# Patient Record
Sex: MALE | Race: BLACK OR AFRICAN AMERICAN | NOT HISPANIC OR LATINO | Employment: UNEMPLOYED | URBAN - METROPOLITAN AREA
[De-identification: names, ages, dates, MRNs, and addresses within clinical notes are randomized per-mention and may not be internally consistent; named-entity substitution may affect disease eponyms.]

---

## 2021-04-12 ENCOUNTER — HISTORICAL (OUTPATIENT)
Dept: ADMINISTRATIVE | Facility: HOSPITAL | Age: 61
End: 2021-04-12

## 2021-10-03 ENCOUNTER — HISTORICAL (OUTPATIENT)
Dept: ADMINISTRATIVE | Facility: HOSPITAL | Age: 61
End: 2021-10-03

## 2021-10-03 LAB
CHOLEST SERPL-MCNC: 166 MG/DL
CHOLEST/HDLC SERPL: 5 {RATIO} (ref 0–5)
HDLC SERPL-MCNC: 34 MG/DL (ref 35–60)
LDLC SERPL CALC-MCNC: 112 MG/DL (ref 50–140)
TRIGL SERPL-MCNC: 99 MG/DL (ref 34–140)
VLDLC SERPL CALC-MCNC: 20 MG/DL

## 2022-04-09 ENCOUNTER — HISTORICAL (OUTPATIENT)
Dept: ADMINISTRATIVE | Facility: HOSPITAL | Age: 62
End: 2022-04-09

## 2022-04-25 VITALS
DIASTOLIC BLOOD PRESSURE: 80 MMHG | SYSTOLIC BLOOD PRESSURE: 127 MMHG | HEIGHT: 70 IN | WEIGHT: 211.19 LBS | BODY MASS INDEX: 30.23 KG/M2

## 2022-10-17 ENCOUNTER — HOSPITAL ENCOUNTER (EMERGENCY)
Facility: HOSPITAL | Age: 62
Discharge: HOME | End: 2022-10-17
Attending: FAMILY MEDICINE
Payer: MEDICAID

## 2022-10-17 VITALS
OXYGEN SATURATION: 98 % | RESPIRATION RATE: 14 BRPM | HEIGHT: 70 IN | DIASTOLIC BLOOD PRESSURE: 72 MMHG | BODY MASS INDEX: 30.25 KG/M2 | HEART RATE: 59 BPM | TEMPERATURE: 98 F | SYSTOLIC BLOOD PRESSURE: 144 MMHG | WEIGHT: 211.31 LBS
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
## 2022-10-17 DIAGNOSIS — M25.569 KNEE PAIN, UNSPECIFIED CHRONICITY, UNSPECIFIED LATERALITY: Primary | ICD-10-CM

## 2022-10-17 PROCEDURE — 99284 EMERGENCY DEPT VISIT MOD MDM: CPT | Mod: 25

## 2022-10-17 PROCEDURE — 63600175 PHARM REV CODE 636 W HCPCS: Performed by: PHYSICIAN ASSISTANT

## 2022-10-17 RX ORDER — TRAMADOL HYDROCHLORIDE 50 MG/1
50 TABLET ORAL EVERY 12 HOURS PRN
Qty: 10 TABLET | Refills: 0 | Status: SHIPPED | OUTPATIENT
Start: 2022-10-17 | End: 2022-10-22

## 2022-10-17 RX ORDER — PREDNISONE 10 MG/1
20 TABLET ORAL
Status: COMPLETED | OUTPATIENT
Start: 2022-10-17 | End: 2022-10-17

## 2022-10-17 RX ORDER — METHOCARBAMOL 500 MG/1
500 TABLET, FILM COATED ORAL 3 TIMES DAILY
Qty: 15 TABLET | Refills: 0 | Status: SHIPPED | OUTPATIENT
Start: 2022-10-17 | End: 2022-10-22

## 2022-10-17 RX ADMIN — PREDNISONE 20 MG: 10 TABLET ORAL at 11:10

## 2022-10-17 NOTE — ED PROVIDER NOTES
Encounter Date: 10/17/2022       History     Chief Complaint   Patient presents with    Leg Pain     Pt in with complaints of left leg and back pain after falling this morning.     Patient reports back and left leg pain after a slip and fall at home; pt denies any LOC; pt reports a chronic issue with his back and knee    The history is provided by the patient.   Leg Pain   The incident occurred at home. The injury mechanism was a fall. The incident occurred several hours ago. The pain is present in the left leg. The quality of the pain is described as aching. The pain is at a severity of 4/10. The pain has been Constant since onset. Pertinent negatives include no numbness, no inability to bear weight, no loss of motion, no muscle weakness, no loss of sensation and no tingling. He reports no foreign bodies present. The symptoms are aggravated by bearing weight and palpation. He has tried rest and NSAIDs for the symptoms. The treatment provided mild relief.   Review of patient's allergies indicates:  No Known Allergies  Past Medical History:   Diagnosis Date    Hypertension      Past Surgical History:   Procedure Laterality Date    HIP SURGERY Left     LEG SURGERY Left      No family history on file.  Social History     Tobacco Use    Smoking status: Every Day     Types: Cigarettes    Smokeless tobacco: Never     Review of Systems   Constitutional:  Negative for fever.   HENT:  Negative for sore throat.    Respiratory:  Negative for shortness of breath.    Cardiovascular:  Negative for chest pain.   Gastrointestinal:  Negative for nausea.   Genitourinary:  Negative for dysuria.   Musculoskeletal:  Positive for back pain.   Skin:  Negative for rash.   Neurological:  Negative for tingling, weakness and numbness.   Hematological:  Does not bruise/bleed easily.   Psychiatric/Behavioral: Negative.       Physical Exam     Initial Vitals [10/17/22 1014]   BP Pulse Resp Temp SpO2   (!) 144/72 (!) 59 14 97.5 °F (36.4 °C) 98 %       MAP       --         Physical Exam    Vitals reviewed.  Constitutional: He appears well-developed.   HENT:   Head: Normocephalic and atraumatic.   Eyes: Conjunctivae and EOM are normal. Pupils are equal, round, and reactive to light.   Neck:   Normal range of motion.  Cardiovascular:  Normal rate, regular rhythm and normal heart sounds.           Pulmonary/Chest: Breath sounds normal. He exhibits no tenderness.   Abdominal: Abdomen is soft. Bowel sounds are normal. He exhibits no distension. There is no abdominal tenderness.   Musculoskeletal:      Cervical back: Normal and normal range of motion.      Thoracic back: Normal.      Lumbar back: Tenderness present. No swelling. Decreased range of motion.        Back:       Right hip: Normal.      Left hip: Bony tenderness present. Decreased range of motion.      Right upper leg: Normal.      Left upper leg: Tenderness present. No edema or deformity.      Right lower leg: Normal.      Left lower leg: Normal.      Right ankle: Normal pulse.      Left ankle: Normal pulse.        Legs:      Neurological: He is alert and oriented to person, place, and time. He displays normal reflexes. No cranial nerve deficit or sensory deficit. GCS score is 15. GCS eye subscore is 4. GCS verbal subscore is 5. GCS motor subscore is 6.   Skin: Skin is warm. No pallor.   Psychiatric: He has a normal mood and affect. His behavior is normal. Judgment and thought content normal.       ED Course   Procedures  Labs Reviewed - No data to display       Imaging Results              X-Ray Knee 3 View Left (Final result)  Result time 10/17/22 12:01:51      Final result by Kenzie Davis MD (10/17/22 12:01:51)                   Impression:      No acute fracture identified.      Electronically signed by: Kenzie Davis  Date:    10/17/2022  Time:    12:01               Narrative:    EXAMINATION:  XR KNEE 3 VIEW LEFT    CLINICAL HISTORY:  Unspecified fall, initial  encounter    COMPARISON:  X-rays dated 04/25/2021    FINDINGS:  There is stable alignment following internal fixation of the femur.  There is no acute fracture or malalignment.  There is no joint effusion.  The soft tissues are unremarkable.                                       X-Ray Hip 2 or 3 views Left (with Pelvis when performed) (Final result)  Result time 10/17/22 12:00:54      Final result by Kenzie Davis MD (10/17/22 12:00:54)                   Impression:      No displaced fracture identified.      Electronically signed by: Kenzie Davis  Date:    10/17/2022  Time:    12:00               Narrative:    EXAMINATION:  XR HIP WITH PELVIS WHEN PERFORMED, 2 OR 3 VIEWS LEFT    CLINICAL HISTORY:  Unspecified fall, initial encounter    COMPARISON:  X-rays dated 04/25/2021    FINDINGS:  There is stable postoperative changes of the left femur.  There is no acute fracture or malalignment.  The hip joint spaces are preserved.  The pelvic ring structures are intact.                                       X-Ray Lumbar Spine Ap And Lateral (Final result)  Result time 10/17/22 11:59:46      Final result by Kenzie Davis MD (10/17/22 11:59:46)                   Impression:      No acute abnormality identified.      Electronically signed by: Kenzie Davis  Date:    10/17/2022  Time:    11:59               Narrative:    EXAMINATION:  XR LUMBAR SPINE AP AND LATERAL    CLINICAL HISTORY:  fall;    COMPARISON:  X-rays dated 04/25/2021    FINDINGS:  There are 5 non-rib-bearing lumbar type vertebral bodies.  Alignment is preserved.  The vertebral body heights and disc spaces are maintained.  There small marginal osteophytes.  There is moderate facet arthropathy.  There is scattered vascular calcifications.                                       Medications   predniSONE tablet 20 mg (20 mg Oral Given 10/17/22 1134)                              Clinical Impression:   Final diagnoses:  [W19.XXXA] Fall  [M25.569] Knee  pain, unspecified chronicity, unspecified laterality (Primary)      ED Disposition Condition    Discharge Stable          ED Prescriptions       Medication Sig Dispense Start Date End Date Auth. Provider    methocarbamoL (ROBAXIN) 500 MG Tab Take 1 tablet (500 mg total) by mouth 3 (three) times daily. for 5 days 15 tablet 10/17/2022 10/22/2022 NANCY Dickerson    traMADoL (ULTRAM) 50 mg tablet Take 1 tablet (50 mg total) by mouth every 12 (twelve) hours as needed for Pain. 10 tablet 10/17/2022 10/22/2022 NANCY Dickerson          Follow-up Information       Follow up With Specialties Details Why Contact Info    discharge followup    If your symptoms become WORSE or you DO NOT IMPROVE and you are unable to reach your health care provider, you should RETURN to the emergency department    discharge info    Discussed all pertinent ED information, results, diagnosis and treatment plan; All questions and concerns were addressed at this time. Patient voices understanding of information and instructions. Patient is comfortable with plan and discharge             NANCY Dickerson  10/17/22 [id number]

## 2023-04-02 ENCOUNTER — HOSPITAL ENCOUNTER (EMERGENCY)
Facility: HOSPITAL | Age: 63
Discharge: PSYCHIATRIC OR SUBSTANCE USE FACILITY | End: 2023-04-02
Attending: INTERNAL MEDICINE
Payer: MEDICAID

## 2023-04-02 VITALS
BODY MASS INDEX: 30.28 KG/M2 | WEIGHT: 211 LBS | HEART RATE: 63 BPM | OXYGEN SATURATION: 98 % | RESPIRATION RATE: 16 BRPM | SYSTOLIC BLOOD PRESSURE: 146 MMHG | DIASTOLIC BLOOD PRESSURE: 77 MMHG

## 2023-04-02 DIAGNOSIS — F23 ACUTE PSYCHOSIS: ICD-10-CM

## 2023-04-02 DIAGNOSIS — N30.00 ACUTE CYSTITIS WITHOUT HEMATURIA: ICD-10-CM

## 2023-04-02 DIAGNOSIS — F14.10 COCAINE ABUSE: ICD-10-CM

## 2023-04-02 DIAGNOSIS — R44.3 HALLUCINATIONS: ICD-10-CM

## 2023-04-02 DIAGNOSIS — Z00.8 MEDICAL CLEARANCE FOR PSYCHIATRIC ADMISSION: ICD-10-CM

## 2023-04-02 DIAGNOSIS — F12.91 MARIJUANA USE DISORDER IN REMISSION: Primary | ICD-10-CM

## 2023-04-02 LAB
ALBUMIN SERPL-MCNC: 4.5 G/DL (ref 3.4–4.8)
ALBUMIN/GLOB SERPL: 1 RATIO (ref 1.1–2)
ALP SERPL-CCNC: 89 UNIT/L (ref 40–150)
ALT SERPL-CCNC: 44 UNIT/L (ref 0–55)
AMPHET UR QL SCN: NEGATIVE
APAP SERPL-MCNC: <17.4 UG/ML (ref 17.4–30)
APPEARANCE UR: CLEAR
AST SERPL-CCNC: 29 UNIT/L (ref 5–34)
BACTERIA #/AREA URNS AUTO: ABNORMAL /HPF
BARBITURATE SCN PRESENT UR: NEGATIVE
BASOPHILS # BLD AUTO: 0.08 X10(3)/MCL (ref 0–0.2)
BASOPHILS NFR BLD AUTO: 1 %
BENZODIAZ UR QL SCN: NEGATIVE
BILIRUB UR QL STRIP.AUTO: NEGATIVE MG/DL
BILIRUBIN DIRECT+TOT PNL SERPL-MCNC: 0.5 MG/DL
BUN SERPL-MCNC: 11.4 MG/DL (ref 8.4–25.7)
CALCIUM SERPL-MCNC: 10.7 MG/DL (ref 8.8–10)
CANNABINOIDS UR QL SCN: POSITIVE
CHLORIDE SERPL-SCNC: 106 MMOL/L (ref 98–107)
CO2 SERPL-SCNC: 22 MMOL/L (ref 23–31)
COCAINE UR QL SCN: POSITIVE
COLOR UR AUTO: YELLOW
CREAT SERPL-MCNC: 0.85 MG/DL (ref 0.73–1.18)
EOSINOPHIL # BLD AUTO: 0.18 X10(3)/MCL (ref 0–0.9)
EOSINOPHIL NFR BLD AUTO: 2.3 %
ERYTHROCYTE [DISTWIDTH] IN BLOOD BY AUTOMATED COUNT: 14.6 % (ref 11.5–17)
ETHANOL SERPL-MCNC: <10 MG/DL
FENTANYL UR QL SCN: NEGATIVE
FLUAV AG UPPER RESP QL IA.RAPID: NOT DETECTED
FLUBV AG UPPER RESP QL IA.RAPID: NOT DETECTED
GFR SERPLBLD CREATININE-BSD FMLA CKD-EPI: >60 MLS/MIN/1.73/M2
GLOBULIN SER-MCNC: 4.3 GM/DL (ref 2.4–3.5)
GLUCOSE SERPL-MCNC: 110 MG/DL (ref 82–115)
GLUCOSE UR QL STRIP.AUTO: NORMAL MG/DL
HCT VFR BLD AUTO: 49.5 % (ref 42–52)
HGB BLD-MCNC: 16.2 G/DL (ref 14–18)
HYALINE CASTS #/AREA URNS LPF: >20 /LPF
IMM GRANULOCYTES # BLD AUTO: 0.02 X10(3)/MCL (ref 0–0.04)
IMM GRANULOCYTES NFR BLD AUTO: 0.3 %
KETONES UR QL STRIP.AUTO: ABNORMAL MG/DL
LEUKOCYTE ESTERASE UR QL STRIP.AUTO: 75 UNIT/L
LYMPHOCYTES # BLD AUTO: 2.22 X10(3)/MCL (ref 0.6–4.6)
LYMPHOCYTES NFR BLD AUTO: 28.8 %
MCH RBC QN AUTO: 28.7 PG (ref 27–31)
MCHC RBC AUTO-ENTMCNC: 32.7 G/DL (ref 33–36)
MCV RBC AUTO: 87.6 FL (ref 80–94)
MDMA UR QL SCN: NEGATIVE
MONOCYTES # BLD AUTO: 0.72 X10(3)/MCL (ref 0.1–1.3)
MONOCYTES NFR BLD AUTO: 9.3 %
MUCOUS THREADS URNS QL MICRO: ABNORMAL /LPF
NEUTROPHILS # BLD AUTO: 4.49 X10(3)/MCL (ref 2.1–9.2)
NEUTROPHILS NFR BLD AUTO: 58.3 %
NITRITE UR QL STRIP.AUTO: NEGATIVE
NRBC BLD AUTO-RTO: 0 %
OPIATES UR QL SCN: NEGATIVE
PCP UR QL: NEGATIVE
PH UR STRIP.AUTO: 5.5 [PH]
PH UR: 5.5 [PH] (ref 3–11)
PLATELET # BLD AUTO: 286 X10(3)/MCL (ref 130–400)
PMV BLD AUTO: 10.1 FL (ref 7.4–10.4)
POTASSIUM SERPL-SCNC: 3.6 MMOL/L (ref 3.5–5.1)
PROT SERPL-MCNC: 8.8 GM/DL (ref 5.8–7.6)
PROT UR QL STRIP.AUTO: ABNORMAL MG/DL
RBC # BLD AUTO: 5.65 X10(6)/MCL (ref 4.7–6.1)
RBC #/AREA URNS AUTO: ABNORMAL /HPF
RBC UR QL AUTO: NEGATIVE UNIT/L
SARS-COV-2 RNA RESP QL NAA+PROBE: NOT DETECTED
SODIUM SERPL-SCNC: 138 MMOL/L (ref 136–145)
SP GR UR STRIP.AUTO: 1.02
SQUAMOUS #/AREA URNS LPF: ABNORMAL /HPF
TSH SERPL-ACNC: 1.08 UIU/ML (ref 0.35–4.94)
UROBILINOGEN UR STRIP-ACNC: ABNORMAL MG/DL
WBC # SPEC AUTO: 7.7 X10(3)/MCL (ref 4.5–11.5)
WBC #/AREA URNS AUTO: ABNORMAL /HPF

## 2023-04-02 PROCEDURE — 85025 COMPLETE CBC W/AUTO DIFF WBC: CPT | Performed by: INTERNAL MEDICINE

## 2023-04-02 PROCEDURE — 80143 DRUG ASSAY ACETAMINOPHEN: CPT | Performed by: INTERNAL MEDICINE

## 2023-04-02 PROCEDURE — 84443 ASSAY THYROID STIM HORMONE: CPT | Performed by: INTERNAL MEDICINE

## 2023-04-02 PROCEDURE — 99285 EMERGENCY DEPT VISIT HI MDM: CPT

## 2023-04-02 PROCEDURE — 96372 THER/PROPH/DIAG INJ SC/IM: CPT | Performed by: INTERNAL MEDICINE

## 2023-04-02 PROCEDURE — 93005 ELECTROCARDIOGRAM TRACING: CPT

## 2023-04-02 PROCEDURE — 63600175 PHARM REV CODE 636 W HCPCS: Performed by: INTERNAL MEDICINE

## 2023-04-02 PROCEDURE — 81001 URINALYSIS AUTO W/SCOPE: CPT | Performed by: INTERNAL MEDICINE

## 2023-04-02 PROCEDURE — 80053 COMPREHEN METABOLIC PANEL: CPT | Performed by: INTERNAL MEDICINE

## 2023-04-02 PROCEDURE — 0240U COVID/FLU A&B PCR: CPT | Performed by: INTERNAL MEDICINE

## 2023-04-02 PROCEDURE — 82077 ASSAY SPEC XCP UR&BREATH IA: CPT | Performed by: INTERNAL MEDICINE

## 2023-04-02 PROCEDURE — 80307 DRUG TEST PRSMV CHEM ANLYZR: CPT | Performed by: INTERNAL MEDICINE

## 2023-04-02 RX ORDER — NITROFURANTOIN 25; 75 MG/1; MG/1
100 CAPSULE ORAL 2 TIMES DAILY
Qty: 10 CAPSULE | Refills: 0 | Status: SHIPPED | OUTPATIENT
Start: 2023-04-02 | End: 2023-04-07

## 2023-04-02 RX ORDER — NITROFURANTOIN 25; 75 MG/1; MG/1
100 CAPSULE ORAL EVERY 12 HOURS
Status: DISCONTINUED | OUTPATIENT
Start: 2023-04-02 | End: 2023-04-02 | Stop reason: HOSPADM

## 2023-04-02 RX ORDER — DROPERIDOL 2.5 MG/ML
2.5 INJECTION, SOLUTION INTRAMUSCULAR; INTRAVENOUS ONCE
Status: COMPLETED | OUTPATIENT
Start: 2023-04-02 | End: 2023-04-02

## 2023-04-02 RX ADMIN — DROPERIDOL 2.5 MG: 2.5 INJECTION, SOLUTION INTRAMUSCULAR; INTRAVENOUS at 09:04

## 2023-04-02 NOTE — ED PROVIDER NOTES
"Date: 4/2/2023    Time of Note: 8:43 AM     Source of History:  History from patient and medics    Chief complaint:  Hallucinations ("Smoked weed" about and hour ago now having AVH. Denies SI/HI calm and cooperative.)      HPI:    62 y.o. male,  has a past medical history of Hypertension. Presenting with Hallucinations ("Smoked weed" about and hour ago now having AVH. Denies SI/HI calm and cooperative.)       Review of Systems:    As per HPI and below:  Constitutional: No Fever.  No Chills.  Eyes: No Visual Changes.  ENT: None voiced by patient.    Cardiovascular: No Chest Pain.  Respiratory: No Shortness of breath. No Cough  GastrointestinaI: No Abdominal Pain.  No Nausea No Vomiting. No Diarrhea, No Constipation.  Genitourinary: No Dysuria  Neurologic: No Headache. No New Focal Weakness.  MSK: No Back Pain.  Integument: No Rash.  Psychiatric:  Use some marijuana and started having hallucinations and can not control himself    Allergies:    Review of patient's allergies indicates:  No Known Allergies    Home Medicines on Chart:    No current outpatient medications on file prior to encounter.       PMH:  As per HPI and below:    Past Medical History:   Diagnosis Date    Hypertension        Past Surgical History:   Procedure Laterality Date    HIP SURGERY Left     LEG SURGERY Left        Social History     Tobacco Use    Smoking status: Every Day     Types: Cigarettes    Smokeless tobacco: Never       History reviewed. No pertinent family history.     Patient Active Problem List    Diagnosis Date Noted    Cocaine abuse 04/02/2023    Marijuana use disorder in remission 04/02/2023       Physical Exam:    Vitals:    04/02/23 0802   BP: (!) 167/105   Pulse: 105   Resp: 18       BP (!) 167/105   Pulse 105   Resp 18   Wt 95.7 kg (211 lb)   SpO2 98%   BMI 30.28 kg/m²     Nursing note and vital signs reviewed.    Constitutional: No acute distress.  Nontoxic  Eyes: No conjunctival injection.  Extraocular muscles are " "intact.  ENT: Oropharynx clear.    Cardiovascular: Regular rate and rhythm.  No murmurs.   Respiratory: No Respiratory Distress. Good Bilateral air movement.  No rhonchi. No rales.  Abdomen: Soft.  Not distended.  Nontender.  No guarding.  No rebound. Bowel Sounds Normal.  Musculoskeletal: Good range of motion all joints.  No Gross deformities Noted.  Skin: No Obvious Rashes seen.    Neurologic:  Awake and Alert.  Cranial Nerves Grossly intact. No focal neurological deficits.  Psychiatry:  Hallucinations, history of schizophrenia, disheveled look, crying,    Labs that have been ordered have been independently reviewed and interpreted by myself.      MDM/ Differential Dx:  Reviewed Nurses Notes and Vitals.    Medical Decision Making  62 y.o. male,  has a past medical history of Hypertension. Presenting with Hallucinations ("Smoked weed" about and hour ago now having AVH. Denies SI/HI calm and cooperative.)  Crying, says he wants to go somewhere where he can get some help,    Patient is very psychotic, crying, hallucinating, will give him Inapsine to help him with his psychosis, and will pec him to send him to a psych facility for evaluation and further treatment as needed.      Problems Addressed:  Acute cystitis without hematuria:     Details: Will put patient on nitrofurantoin  Acute psychosis:     Details: Patient is given Inapsine in the emergency room, he is common comfortable, will send him to the psych facility for further evaluation and treatment as needed    Amount and/or Complexity of Data Reviewed  Labs: ordered. Decision-making details documented in ED Course.         ED COURSE    ED Course as of 04/02/23 1010   Sun Apr 02, 2023   1000 Patient has calmed down, his actually sleeping not crying anymore, I will clear him to go to the psych facility for further management by the psychiatrist, he does have cocaine and marijuana on him, he does have some leukocyte esterase in the urine, I will put him on " nitrofurantoin for 5 days. [GQ]      ED Course User Index  [GQ] Jeancarlos Louise MD       ED Orders:  Orders Placed This Encounter   Procedures    CBC auto differential    Comprehensive metabolic panel    TSH    Urinalysis, Reflex to Urine Culture    Drug Screen panel, emergency    Ethanol    Acetaminophen level    CBC with Differential    COVID/FLU A&B PCR    Vital signs    Undress patient and allow them to wear facility provided apparel.    Direct Psych Observation    EKG 12-lead    PEC/Psych Hold - Physicians Emergency Certificate / 72 Hour Psych Hold       ED MEDICINE:  Medications   nitrofurantoin (macrocrystal-monohydrate) 100 MG capsule 100 mg (has no administration in time range)   droperidoL injection 2.5 mg (2.5 mg Intramuscular Given 4/2/23 0932)       Admission on 04/02/2023   Component Date Value Ref Range Status    Sodium Level 04/02/2023 138  136 - 145 mmol/L Final    Potassium Level 04/02/2023 3.6  3.5 - 5.1 mmol/L Final    Chloride 04/02/2023 106  98 - 107 mmol/L Final    Carbon Dioxide 04/02/2023 22 (L)  23 - 31 mmol/L Final    Glucose Level 04/02/2023 110  82 - 115 mg/dL Final    Blood Urea Nitrogen 04/02/2023 11.4  8.4 - 25.7 mg/dL Final    Creatinine 04/02/2023 0.85  0.73 - 1.18 mg/dL Final    Calcium Level Total 04/02/2023 10.7 (H)  8.8 - 10.0 mg/dL Final    Protein Total 04/02/2023 8.8 (H)  5.8 - 7.6 gm/dL Final    Albumin Level 04/02/2023 4.5  3.4 - 4.8 g/dL Final    Globulin 04/02/2023 4.3 (H)  2.4 - 3.5 gm/dL Final    Albumin/Globulin Ratio 04/02/2023 1.0 (L)  1.1 - 2.0 ratio Final    Bilirubin Total 04/02/2023 0.5  <=1.5 mg/dL Final    Alkaline Phosphatase 04/02/2023 89  40 - 150 unit/L Final    Alanine Aminotransferase 04/02/2023 44  0 - 55 unit/L Final    Aspartate Aminotransferase 04/02/2023 29  5 - 34 unit/L Final    eGFR 04/02/2023 >60  mls/min/1.73/m2 Final    Thyroid Stimulating Hormone 04/02/2023 1.077  0.350 - 4.940 uIU/mL Final    Color, UA 04/02/2023 Yellow  Yellow,  Light-Yellow, Dark Yellow, Love, Straw Final    Appearance, UA 04/02/2023 Clear  Clear Final    Specific Gravity, UA 04/02/2023 1.025   Final    pH, UA 04/02/2023 5.5  5.0 - 8.5 Final    Protein, UA 04/02/2023 Trace (A)  Negative mg/dL Final    Glucose, UA 04/02/2023 Normal  Negative, Normal mg/dL Final    Ketones, UA 04/02/2023 Trace (A)  Negative mg/dL Final    Blood, UA 04/02/2023 Negative  Negative unit/L Final    Bilirubin, UA 04/02/2023 Negative  Negative mg/dL Final    Urobilinogen, UA 04/02/2023 2+ (A)  0.2, 1.0, Normal mg/dL Final    Nitrites, UA 04/02/2023 Negative  Negative Final    Leukocyte Esterase, UA 04/02/2023 75 (A)  Negative unit/L Final    WBC, UA 04/02/2023 0-5  None Seen, 0-2, 3-5, 0-5 /HPF Final    Bacteria, UA 04/02/2023 None Seen  None Seen /HPF Final    Squamous Epithelial Cells, UA 04/02/2023 Trace (A)  None Seen /HPF Final    Mucous, UA 04/02/2023 Many (A)  None Seen /LPF Final    Hyaline Casts, UA 04/02/2023 >20 (A)  None Seen /lpf Final    RBC, UA 04/02/2023 0-5  None Seen, 0-2, 3-5, 0-5 /HPF Final    Amphetamines, Urine 04/02/2023 Negative  Negative Final    Barbituates, Urine 04/02/2023 Negative  Negative Final    Benzodiazepine, Urine 04/02/2023 Negative  Negative Final    Cannabinoids, Urine 04/02/2023 Positive (A)  Negative Final    Cocaine, Urine 04/02/2023 Positive (A)  Negative Final    Fentanyl, Urine 04/02/2023 Negative  Negative Final    MDMA, Urine 04/02/2023 Negative  Negative Final    Opiates, Urine 04/02/2023 Negative  Negative Final    Phencyclidine, Urine 04/02/2023 Negative  Negative Final    pH, Urine 04/02/2023 5.5  3.0 - 11.0 Final    Ethanol Level 04/02/2023 <10.0  <=10.0 mg/dL Final    Acetaminophen Level 04/02/2023 <17.4 (L)  17.4 - 30.0 ug/ml Final    WBC 04/02/2023 7.7  4.5 - 11.5 x10(3)/mcL Final    RBC 04/02/2023 5.65  4.70 - 6.10 x10(6)/mcL Final    Hgb 04/02/2023 16.2  14.0 - 18.0 g/dL Final    Hct 04/02/2023 49.5  42.0 - 52.0 % Final    MCV 04/02/2023  87.6  80.0 - 94.0 fL Final    MCH 04/02/2023 28.7  27.0 - 31.0 pg Final    MCHC 04/02/2023 32.7 (L)  33.0 - 36.0 g/dL Final    RDW 04/02/2023 14.6  11.5 - 17.0 % Final    Platelet 04/02/2023 286  130 - 400 x10(3)/mcL Final    MPV 04/02/2023 10.1  7.4 - 10.4 fL Final    Neut % 04/02/2023 58.3  % Final    Lymph % 04/02/2023 28.8  % Final    Mono % 04/02/2023 9.3  % Final    Eos % 04/02/2023 2.3  % Final    Basophil % 04/02/2023 1.0  % Final    Lymph # 04/02/2023 2.22  0.6 - 4.6 x10(3)/mcL Final    Neut # 04/02/2023 4.49  2.1 - 9.2 x10(3)/mcL Final    Mono # 04/02/2023 0.72  0.1 - 1.3 x10(3)/mcL Final    Eos # 04/02/2023 0.18  0 - 0.9 x10(3)/mcL Final    Baso # 04/02/2023 0.08  0 - 0.2 x10(3)/mcL Final    IG# 04/02/2023 0.02  0 - 0.04 x10(3)/mcL Final    IG% 04/02/2023 0.3  % Final    NRBC% 04/02/2023 0.0  % Final    Influenza A PCR 04/02/2023 Not Detected  Not Detected Final    Influenza B PCR 04/02/2023 Not Detected  Not Detected Final    SARS-CoV-2 PCR 04/02/2023 Not Detected  Not Detected, Negative, Invalid Final       ECG Results              EKG 12-lead (Preliminary result)  Result time 04/02/23 10:08:56      Wet Read by Jeancarlos Louise MD (04/02/23 10:08:56, [facility] Dept, Emergency Medicine)    EKG: Independently reviewed and / or Interpreted by Jeancarlos Louise MD. independently as Normal Sinus Rhythm, Rate 68, Normal Axis, LVH, Q waves in anterior septal leads, No STEMI , nonspecific intraventricular conduction delay.                                      No orders to display       Psych Clearance:    At the time of my examination, patient does not appear to have any major medical condition which needs to be addressed by admission to hospital and appears to be in medically optimal condition to undergo a psychiatric evaluation and treatment as needed in a psych facility, Patient will be in a monitored facility and they can always bring back to our ER or the nearest ER for reevaluation in  case develops any other symptoms.    Medically cleared for psychiatry placement: 4/2/2023 10:02 AM                 Diagnostic Impression:        ICD-10-CM ICD-9-CM   1. Marijuana use disorder in remission  F12.91 305.23   2. Medical clearance for psychiatric admission  Z00.8 V70.8   3. Acute psychosis  F23 298.9   4. Hallucinations  R44.3 780.1   5. Cocaine abuse  F14.10 305.60   6. Acute cystitis without hematuria  N30.00 595.0      ED Disposition Condition    Transfer to Psych Facility Stable          ED Prescriptions       Medication Sig Dispense Start Date End Date Auth. Provider    nitrofurantoin, macrocrystal-monohydrate, (MACROBID) 100 MG capsule Take 1 capsule (100 mg total) by mouth 2 (two) times daily. for 5 days 10 capsule 4/2/2023 4/7/2023 Jeancarlos Louise MD          Follow-up Information    None          Medication List        START taking these medications      nitrofurantoin (macrocrystal-monohydrate) 100 MG capsule  Commonly known as: MACROBID  Take 1 capsule (100 mg total) by mouth 2 (two) times daily. for 5 days               Where to Get Your Medications        You can get these medications from any pharmacy    Bring a paper prescription for each of these medications  nitrofurantoin (macrocrystal-monohydrate) 100 MG capsule            Jeancarlos Louise MD  04/02/23 1003       Jeancarlos Louise MD  04/02/23 1010

## 2023-05-08 ENCOUNTER — HOSPITAL ENCOUNTER (EMERGENCY)
Facility: HOSPITAL | Age: 63
Discharge: HOME | End: 2023-05-08
Attending: FAMILY MEDICINE
Payer: MEDICAID

## 2023-05-08 VITALS
BODY MASS INDEX: 30.28 KG/M2 | OXYGEN SATURATION: 100 % | TEMPERATURE: 98 F | SYSTOLIC BLOOD PRESSURE: 112 MMHG | HEART RATE: 68 BPM | WEIGHT: 211 LBS | DIASTOLIC BLOOD PRESSURE: 76 MMHG | RESPIRATION RATE: 18 BRPM

## 2023-05-08 DIAGNOSIS — G89.29 CHRONIC LOW BACK PAIN, UNSPECIFIED BACK PAIN LATERALITY, UNSPECIFIED WHETHER SCIATICA PRESENT: Primary | ICD-10-CM

## 2023-05-08 DIAGNOSIS — M54.50 CHRONIC LOW BACK PAIN, UNSPECIFIED BACK PAIN LATERALITY, UNSPECIFIED WHETHER SCIATICA PRESENT: Primary | ICD-10-CM

## 2023-05-08 PROCEDURE — 63600175 PHARM REV CODE 636 W HCPCS: Performed by: PHYSICIAN ASSISTANT

## 2023-05-08 PROCEDURE — 96372 THER/PROPH/DIAG INJ SC/IM: CPT | Performed by: PHYSICIAN ASSISTANT

## 2023-05-08 PROCEDURE — 25000003 PHARM REV CODE 250: Performed by: PHYSICIAN ASSISTANT

## 2023-05-08 PROCEDURE — 99284 EMERGENCY DEPT VISIT MOD MDM: CPT

## 2023-05-08 RX ORDER — HYDROCODONE BITARTRATE AND ACETAMINOPHEN 5; 325 MG/1; MG/1
1 TABLET ORAL
Status: COMPLETED | OUTPATIENT
Start: 2023-05-08 | End: 2023-05-08

## 2023-05-08 RX ORDER — KETOROLAC TROMETHAMINE 30 MG/ML
30 INJECTION, SOLUTION INTRAMUSCULAR; INTRAVENOUS
Status: COMPLETED | OUTPATIENT
Start: 2023-05-08 | End: 2023-05-08

## 2023-05-08 RX ORDER — METHOCARBAMOL 500 MG/1
500 TABLET, FILM COATED ORAL 3 TIMES DAILY
Qty: 15 TABLET | Refills: 0 | Status: SHIPPED | OUTPATIENT
Start: 2023-05-08 | End: 2023-05-13

## 2023-05-08 RX ORDER — INDOMETHACIN 25 MG/1
25 CAPSULE ORAL
Qty: 15 CAPSULE | Refills: 0 | Status: SHIPPED | OUTPATIENT
Start: 2023-05-08 | End: 2023-05-13

## 2023-05-08 RX ADMIN — HYDROCODONE BITARTRATE AND ACETAMINOPHEN 1 TABLET: 5; 325 TABLET ORAL at 10:05

## 2023-05-08 RX ADMIN — KETOROLAC TROMETHAMINE 30 MG: 30 INJECTION, SOLUTION INTRAMUSCULAR; INTRAVENOUS at 10:05
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
## 2023-05-17 ENCOUNTER — HOSPITAL ENCOUNTER (INPATIENT)
Facility: HOSPITAL | Age: 63
LOS: 5 days | Discharge: HOME | DRG: 885 | End: 2023-05-22
Attending: PSYCHIATRY & NEUROLOGY | Admitting: PSYCHIATRY & NEUROLOGY
Payer: MEDICAID

## 2023-05-17 ENCOUNTER — HOSPITAL ENCOUNTER (EMERGENCY)
Facility: HOSPITAL | Age: 63
Discharge: PSYCHIATRIC OR SUBSTANCE USE FACILITY | End: 2023-05-17
Attending: FAMILY MEDICINE
Payer: MEDICAID

## 2023-05-17 VITALS
HEART RATE: 72 BPM | RESPIRATION RATE: 20 BRPM | OXYGEN SATURATION: 98 % | DIASTOLIC BLOOD PRESSURE: 66 MMHG | BODY MASS INDEX: 31.98 KG/M2 | SYSTOLIC BLOOD PRESSURE: 124 MMHG | HEIGHT: 68 IN | TEMPERATURE: 97 F | WEIGHT: 211 LBS

## 2023-05-17 DIAGNOSIS — F20.9 SCHIZOPHRENIA: ICD-10-CM

## 2023-05-17 DIAGNOSIS — R44.1 VISUAL HALLUCINATIONS: Primary | ICD-10-CM

## 2023-05-17 DIAGNOSIS — R44.0 AUDITORY HALLUCINATIONS: ICD-10-CM

## 2023-05-17 LAB
ALBUMIN SERPL-MCNC: 4.2 G/DL (ref 3.4–4.8)
ALBUMIN/GLOB SERPL: 1 RATIO (ref 1.1–2)
ALP SERPL-CCNC: 95 UNIT/L (ref 40–150)
ALT SERPL-CCNC: 73 UNIT/L (ref 0–55)
AMPHET UR QL SCN: NEGATIVE
APPEARANCE UR: CLEAR
AST SERPL-CCNC: 39 UNIT/L (ref 5–34)
BACTERIA #/AREA URNS AUTO: ABNORMAL /HPF
BARBITURATE SCN PRESENT UR: NEGATIVE
BASOPHILS # BLD AUTO: 0.07 X10(3)/MCL
BASOPHILS NFR BLD AUTO: 0.8 %
BENZODIAZ UR QL SCN: NEGATIVE
BILIRUB UR QL STRIP.AUTO: NEGATIVE MG/DL
BILIRUBIN DIRECT+TOT PNL SERPL-MCNC: 0.5 MG/DL
BUN SERPL-MCNC: 20.1 MG/DL (ref 8.4–25.7)
CALCIUM SERPL-MCNC: 10.4 MG/DL (ref 8.8–10)
CANNABINOIDS UR QL SCN: POSITIVE
CHLORIDE SERPL-SCNC: 109 MMOL/L (ref 98–107)
CO2 SERPL-SCNC: 21 MMOL/L (ref 23–31)
COCAINE UR QL SCN: POSITIVE
COLOR UR: YELLOW
CREAT SERPL-MCNC: 0.97 MG/DL (ref 0.73–1.18)
EOSINOPHIL # BLD AUTO: 0.21 X10(3)/MCL (ref 0–0.9)
EOSINOPHIL NFR BLD AUTO: 2.4 %
ERYTHROCYTE [DISTWIDTH] IN BLOOD BY AUTOMATED COUNT: 14.6 % (ref 11.5–17)
ETHANOL SERPL-MCNC: <10 MG/DL
FENTANYL UR QL SCN: NEGATIVE
GFR SERPLBLD CREATININE-BSD FMLA CKD-EPI: >60 MLS/MIN/1.73/M2
GLOBULIN SER-MCNC: 4.1 GM/DL (ref 2.4–3.5)
GLUCOSE SERPL-MCNC: 104 MG/DL (ref 82–115)
GLUCOSE UR QL STRIP.AUTO: NORMAL MG/DL
HCT VFR BLD AUTO: 47.3 % (ref 42–52)
HGB BLD-MCNC: 15.4 G/DL (ref 14–18)
HYALINE CASTS #/AREA URNS LPF: ABNORMAL /LPF
IMM GRANULOCYTES # BLD AUTO: 0.02 X10(3)/MCL (ref 0–0.04)
IMM GRANULOCYTES NFR BLD AUTO: 0.2 %
KETONES UR QL STRIP.AUTO: NEGATIVE MG/DL
LEUKOCYTE ESTERASE UR QL STRIP.AUTO: NEGATIVE UNIT/L
LYMPHOCYTES # BLD AUTO: 3.27 X10(3)/MCL (ref 0.6–4.6)
LYMPHOCYTES NFR BLD AUTO: 37.2 %
MCH RBC QN AUTO: 28.7 PG (ref 27–31)
MCHC RBC AUTO-ENTMCNC: 32.6 G/DL (ref 33–36)
MCV RBC AUTO: 88.1 FL (ref 80–94)
MDMA UR QL SCN: NEGATIVE
MONOCYTES # BLD AUTO: 0.79 X10(3)/MCL (ref 0.1–1.3)
MONOCYTES NFR BLD AUTO: 9 %
MUCOUS THREADS URNS QL MICRO: ABNORMAL /LPF
NEUTROPHILS # BLD AUTO: 4.43 X10(3)/MCL (ref 2.1–9.2)
NEUTROPHILS NFR BLD AUTO: 50.4 %
NITRITE UR QL STRIP.AUTO: NEGATIVE
NRBC BLD AUTO-RTO: 0 %
OPIATES UR QL SCN: NEGATIVE
PCP UR QL: NEGATIVE
PH UR STRIP.AUTO: 5 [PH]
PH UR: 5 [PH] (ref 3–11)
PLATELET # BLD AUTO: 296 X10(3)/MCL (ref 130–400)
PMV BLD AUTO: 9.5 FL (ref 7.4–10.4)
POTASSIUM SERPL-SCNC: 4 MMOL/L (ref 3.5–5.1)
PROT SERPL-MCNC: 8.3 GM/DL (ref 5.8–7.6)
PROT UR QL STRIP.AUTO: ABNORMAL MG/DL
RBC # BLD AUTO: 5.37 X10(6)/MCL (ref 4.7–6.1)
RBC #/AREA URNS AUTO: ABNORMAL /HPF
RBC UR QL AUTO: NEGATIVE UNIT/L
SARS-COV-2 RDRP RESP QL NAA+PROBE: NEGATIVE
SODIUM SERPL-SCNC: 140 MMOL/L (ref 136–145)
SP GR UR STRIP.AUTO: 1.03
SQUAMOUS #/AREA URNS LPF: ABNORMAL /HPF
TSH SERPL-ACNC: 1.4 UIU/ML (ref 0.35–4.94)
UNIDENT CRYS #/AREA URNS HPF: ABNORMAL /HPF
UROBILINOGEN UR STRIP-ACNC: ABNORMAL MG/DL
WBC # SPEC AUTO: 8.79 X10(3)/MCL (ref 4.5–11.5)
WBC #/AREA URNS AUTO: ABNORMAL /HPF

## 2023-05-17 PROCEDURE — 81001 URINALYSIS AUTO W/SCOPE: CPT | Performed by: FAMILY MEDICINE

## 2023-05-17 PROCEDURE — 87635 SARS-COV-2 COVID-19 AMP PRB: CPT | Performed by: FAMILY MEDICINE

## 2023-05-17 PROCEDURE — 80053 COMPREHEN METABOLIC PANEL: CPT | Performed by: FAMILY MEDICINE

## 2023-05-17 PROCEDURE — 84443 ASSAY THYROID STIM HORMONE: CPT | Performed by: FAMILY MEDICINE

## 2023-05-17 PROCEDURE — 25000003 PHARM REV CODE 250: Performed by: PSYCHIATRY & NEUROLOGY

## 2023-05-17 PROCEDURE — 82077 ASSAY SPEC XCP UR&BREATH IA: CPT | Performed by: FAMILY MEDICINE

## 2023-05-17 PROCEDURE — 99285 EMERGENCY DEPT VISIT HI MDM: CPT

## 2023-05-17 PROCEDURE — 80307 DRUG TEST PRSMV CHEM ANLYZR: CPT | Performed by: FAMILY MEDICINE

## 2023-05-17 PROCEDURE — 11400000 HC PSYCH PRIVATE ROOM

## 2023-05-17 PROCEDURE — 85025 COMPLETE CBC W/AUTO DIFF WBC: CPT | Performed by: FAMILY MEDICINE

## 2023-05-17 RX ORDER — IBUPROFEN 200 MG
1 TABLET ORAL DAILY
Status: DISCONTINUED | OUTPATIENT
Start: 2023-05-17 | End: 2023-05-22 | Stop reason: HOSPADM

## 2023-05-17 RX ORDER — MAG HYDROX/ALUMINUM HYD/SIMETH 200-200-20
30 SUSPENSION, ORAL (FINAL DOSE FORM) ORAL EVERY 6 HOURS PRN
Status: DISCONTINUED | OUTPATIENT
Start: 2023-05-17 | End: 2023-05-21

## 2023-05-17 RX ORDER — DIPHENHYDRAMINE HYDROCHLORIDE 50 MG/ML
50 INJECTION INTRAMUSCULAR; INTRAVENOUS EVERY 6 HOURS PRN
Status: DISCONTINUED | OUTPATIENT
Start: 2023-05-17 | End: 2023-05-22 | Stop reason: HOSPADM

## 2023-05-17 RX ORDER — HALOPERIDOL 5 MG/ML
10 INJECTION INTRAMUSCULAR EVERY 6 HOURS PRN
Status: DISCONTINUED | OUTPATIENT
Start: 2023-05-17 | End: 2023-05-22 | Stop reason: HOSPADM

## 2023-05-17 RX ORDER — ARIPIPRAZOLE 5 MG/1
5 TABLET ORAL DAILY
Status: DISCONTINUED | OUTPATIENT
Start: 2023-05-17 | End: 2023-05-22 | Stop reason: HOSPADM

## 2023-05-17 RX ORDER — DIPHENHYDRAMINE HCL 50 MG
50 CAPSULE ORAL EVERY 6 HOURS PRN
Status: DISCONTINUED | OUTPATIENT
Start: 2023-05-17 | End: 2023-05-22 | Stop reason: HOSPADM

## 2023-05-17 RX ORDER — ACETAMINOPHEN 325 MG/1
650 TABLET ORAL EVERY 6 HOURS PRN
Status: DISCONTINUED | OUTPATIENT
Start: 2023-05-17 | End: 2023-05-22 | Stop reason: HOSPADM

## 2023-05-17 RX ORDER — HALOPERIDOL 5 MG/1
10 TABLET ORAL EVERY 6 HOURS PRN
Status: DISCONTINUED | OUTPATIENT
Start: 2023-05-17 | End: 2023-05-22 | Stop reason: HOSPADM

## 2023-05-17 RX ORDER — HYDROXYZINE HYDROCHLORIDE 50 MG/1
50 TABLET, FILM COATED ORAL EVERY 6 HOURS PRN
Status: DISCONTINUED | OUTPATIENT
Start: 2023-05-17 | End: 2023-05-22 | Stop reason: HOSPADM

## 2023-05-17 RX ORDER — LORAZEPAM 1 MG/1
2 TABLET ORAL EVERY 6 HOURS PRN
Status: DISCONTINUED | OUTPATIENT
Start: 2023-05-17 | End: 2023-05-22 | Stop reason: HOSPADM

## 2023-05-17 RX ORDER — LORAZEPAM 2 MG/ML
2 INJECTION INTRAMUSCULAR EVERY 6 HOURS PRN
Status: DISCONTINUED | OUTPATIENT
Start: 2023-05-17 | End: 2023-05-22 | Stop reason: HOSPADM

## 2023-05-17 RX ORDER — TRAZODONE HYDROCHLORIDE 100 MG/1
100 TABLET ORAL NIGHTLY PRN
Status: DISCONTINUED | OUTPATIENT
Start: 2023-05-17 | End: 2023-05-22 | Stop reason: HOSPADM

## 2023-05-17 RX ADMIN — ARIPIPRAZOLE 5 MG: 5 TABLET ORAL at 12:05

## 2023-05-17 NOTE — NURSING
Pt admitted earlier today on a PEC, pt went to the ER, informed them of the use of crack cocaine and was having auditory and visual hallucinations, pt was seen earlier today by  Dr Pascal, pt reports he is seeing his friend Ja and also hearing him talk to him to do better, pt was noncompliant with home meds,  Currently voicing no ADRs or physical complaints, vital signs are stable, pt is not in any physical distress at the current time,  Pt is voicing no suicidal or homicidal ideations, voicing no detox symptoms, pt would like to go to a 28 day drug rehab program when dc'd from here, will monitor for psychosis and detox symptoms and will assist prn.

## 2023-05-17 NOTE — CARE UPDATE
Rehab Referral    Rehab referral sent on pt's behalf to  [facility] and [facility] for post DC plans.

## 2023-05-17 NOTE — GROUP NOTE
Group Psychotherapy       Group Focus: Communication Skills      Number of patients in attendance: 9    Group Start Time: 1400  Group End Time:  1445  Groups Date: 5/17/2023  Group Topic:  Behavioral Health  Group Department: [facility]  Group Facilitators:  Fanny Montanez  _____________________________________________________________________    Patient Name: Efren Ren  MRN: [id number]  Patient Class: IP- Psych   Admission Date\Time: 5/17/2023  6:01 AM  Hospital Length of Stay: 0  Primary Care Provider: Primary Doctor No     Referred by: Acute Psychiatry Unit Treatment Team     Target symptoms: Alcohol Abuse     Patient's response to treatment: Pt did not attend group     Progress toward goals: Minimal progress     Interval History:      Diagnosis:      Plan: Continue treatment on [facility]

## 2023-05-17 NOTE — H&P
"5/17/2023  Efren Ren   1960   [id number]            Psychiatry Inpatient Admission Note    Date of Admission: 5/17/2023  6:01 AM    Current Legal Status: Physician's Emergency Certificate    Chief Complaint: "I still hear things"    SUBJECTIVE:   History of Present Illness:   Efren Ren is a 62 y.o. male placed under a PEC at [facility] due to auditory and visual hallucinations after unknown drug use last night.      Patient states that he had just got out of [facility] in [place] on 5/5/23.  Began using again last night (crack cocaine).  He does report that he was having auditory and visual hallucinations and is still having auditory hallucinations.  Was prescribed Abilify and Wellbutrin last filled 4/11/23.  He is interested in going to [facility] in [place] at discharge but is amenable to going back to [facility] if there is no bed at [facility].  He would like to restart his medications.  Admitted for medication management and behavior monitoring.    UDS: (+)cocaine, cannabinoids  Blood alcohol: <10      Past Psychiatric History:   Previous Psychiatric Hospitalizations: 2 prior inpatient hospitalizations   Previous Medication Trials: Denies  Previous Suicide Attempts: Denies   Outpatient psychiatrist: Denies    Past Medical/Surgical History:   Past Medical History:   Diagnosis Date    Hypertension      Past Surgical History:   Procedure Laterality Date    HIP SURGERY Left     LEG SURGERY Left          Family Psychiatric History:   Denies     Allergies:   Review of patient's allergies indicates:  No Known Allergies    Substance Abuse History:   Tobacco: "A little bit"  Alcohol: "Not hardly"  Illicit Substances: Crack cocaine and cannabis  Treatment: 2      Current Medications:   Home Psychiatric Meds: Abilify 5mg daily, Wellbutrin XL 150mg daily    Scheduled Meds:    nicotine  1 patch Transdermal Daily      PRN Meds: acetaminophen, aluminum-magnesium hydroxide-simethicone, haloperidoL **AND** " diphenhydrAMINE **AND** LORazepam **AND** haloperidol lactate **AND** diphenhydrAMINE **AND** lorazepam, hydrOXYzine HCL, trazodone   Psychotherapeutics (From admission, onward)      Start     Stop Route Frequency Ordered    05/17/23 0602  haloperidoL tablet 10 mg  (Med - Acute  Behavioral Management)        Question:  Is the patient competent?  Answer:  Yes   See Hyperspace for full Linked Orders Report.    -- Oral Every 6 hours PRN 05/17/23 0602    05/17/23 0602  LORazepam tablet 2 mg  (Med - Acute  Behavioral Management)        Question:  Is the patient competent?  Answer:  Yes   See Hyperspace for full Linked Orders Report.    -- Oral Every 6 hours PRN 05/17/23 0602    05/17/23 0602  haloperidol lactate injection 10 mg  (Med - Acute  Behavioral Management)        See Hyperspace for full Linked Orders Report.    -- IM Every 6 hours PRN 05/17/23 0602    05/17/23 0602  LORazepam injection 2 mg  (Med - Acute  Behavioral Management)        Question:  Is the patient competent?  Answer:  Yes   See Hyperspace for full Linked Orders Report.    -- IM Every 6 hours PRN 05/17/23 0602    05/17/23 0602  traZODone tablet 100 mg        Question:  Is the patient competent?  Answer:  Yes    -- Oral Nightly PRN 05/17/23 0602              Social History:  Housing Status: Homeless  Relationship Status/Sexual Orientation: Never    Children: 5  Education: 6th grade education   Employment Status/Info: Disabled    history: Denies  History of physical/sexual abuse: Denies   Access to gun: Denies       Legal History:   Past Charges/Incarcerations: 2 years, but cannot remember the charge   Pending charges: None known      OBJECTIVE:   Medical Review Of Systems:  Constitutional: negative  Respiratory: negative  Cardiovascular: negative  Gastrointestinal: negative  Genitourinary:negative  Musculoskeletal:negative  Neurological: negative    Vitals   Vitals:    05/17/23 0626   BP: (!) 142/75   Pulse: 89   Resp: 18   Temp: 97.6  °F (36.4 °C)        Labs/Imaging/Studies:   Recent Results (from the past 48 hour(s))   Comprehensive metabolic panel    Collection Time: 05/17/23  1:56 AM   Result Value Ref Range    Sodium Level 140 136 - 145 mmol/L    Potassium Level 4.0 3.5 - 5.1 mmol/L    Chloride 109 (H) 98 - 107 mmol/L    Carbon Dioxide 21 (L) 23 - 31 mmol/L    Glucose Level 104 82 - 115 mg/dL    Blood Urea Nitrogen 20.1 8.4 - 25.7 mg/dL    Creatinine 0.97 0.73 - 1.18 mg/dL    Calcium Level Total 10.4 (H) 8.8 - 10.0 mg/dL    Protein Total 8.3 (H) 5.8 - 7.6 gm/dL    Albumin Level 4.2 3.4 - 4.8 g/dL    Globulin 4.1 (H) 2.4 - 3.5 gm/dL    Albumin/Globulin Ratio 1.0 (L) 1.1 - 2.0 ratio    Bilirubin Total 0.5 <=1.5 mg/dL    Alkaline Phosphatase 95 40 - 150 unit/L    Alanine Aminotransferase 73 (H) 0 - 55 unit/L    Aspartate Aminotransferase 39 (H) 5 - 34 unit/L    eGFR >60 mls/min/1.73/m2   TSH    Collection Time: 05/17/23  1:56 AM   Result Value Ref Range    Thyroid Stimulating Hormone 1.403 0.350 - 4.940 uIU/mL   Ethanol    Collection Time: 05/17/23  1:56 AM   Result Value Ref Range    Ethanol Level <10.0 <=10.0 mg/dL   CBC with Differential    Collection Time: 05/17/23  1:56 AM   Result Value Ref Range    WBC 8.79 4.50 - 11.50 x10(3)/mcL    RBC 5.37 4.70 - 6.10 x10(6)/mcL    Hgb 15.4 14.0 - 18.0 g/dL    Hct 47.3 42.0 - 52.0 %    MCV 88.1 80.0 - 94.0 fL    MCH 28.7 27.0 - 31.0 pg    MCHC 32.6 (L) 33.0 - 36.0 g/dL    RDW 14.6 11.5 - 17.0 %    Platelet 296 130 - 400 x10(3)/mcL    MPV 9.5 7.4 - 10.4 fL    Neut % 50.4 %    Lymph % 37.2 %    Mono % 9.0 %    Eos % 2.4 %    Basophil % 0.8 %    Lymph # 3.27 0.6 - 4.6 x10(3)/mcL    Neut # 4.43 2.1 - 9.2 x10(3)/mcL    Mono # 0.79 0.1 - 1.3 x10(3)/mcL    Eos # 0.21 0 - 0.9 x10(3)/mcL    Baso # 0.07 <=0.2 x10(3)/mcL    IG# 0.02 0 - 0.04 x10(3)/mcL    IG% 0.2 %    NRBC% 0.0 %   Urinalysis, Reflex to Urine Culture    Collection Time: 05/17/23  1:58 AM    Specimen: Urine   Result Value Ref Range    Color, UA  Yellow Yellow, Light-Yellow, Dark Yellow, Love, Straw    Appearance, UA Clear Clear    Specific Gravity, UA 1.032     pH, UA 5.0 5.0 - 8.5    Protein, UA 1+ (A) Negative mg/dL    Glucose, UA Normal Negative, Normal mg/dL    Ketones, UA Negative Negative mg/dL    Blood, UA Negative Negative unit/L    Bilirubin, UA Negative Negative mg/dL    Urobilinogen, UA 1+ (A) 0.2, 1.0, Normal mg/dL    Nitrites, UA Negative Negative    Leukocyte Esterase, UA Negative Negative unit/L    WBC, UA 6-10 (A) None Seen, 0-2, 3-5, 0-5 /HPF    Bacteria, UA None Seen None Seen /HPF    Squamous Epithelial Cells, UA Trace (A) None Seen /HPF    Mucous, UA Few (A) None Seen /LPF    Hyaline Casts, UA 0-2 (A) None Seen /lpf    Unclassified Crystal, UA Trace (A) None Seen /HPF    RBC, UA 0-5 None Seen, 0-2, 3-5, 0-5 /HPF   Drug Screen, Urine    Collection Time: 05/17/23  1:58 AM   Result Value Ref Range    Amphetamines, Urine Negative Negative    Barbituates, Urine Negative Negative    Benzodiazepine, Urine Negative Negative    Cannabinoids, Urine Positive (A) Negative    Cocaine, Urine Positive (A) Negative    Fentanyl, Urine Negative Negative    MDMA, Urine Negative Negative    Opiates, Urine Negative Negative    Phencyclidine, Urine Negative Negative    pH, Urine 5.0 3.0 - 11.0   COVID-19 Rapid Screening    Collection Time: 05/17/23  1:58 AM   Result Value Ref Range    SARS COV-2 MOLECULAR Negative Negative      No results found for: PHENYTOIN, PHENOBARB, VALPROATE, CBMZ        Psychiatric Mental Status Exam:  General Appearance: appears stated age, well-developed, well-nourished  Arousal: alert  Behavior: cooperative  Movements and Motor Activity: no abnormal involuntary movements noted  Orientation: oriented to person, place, time, and situation  Speech: normal rate, normal rhythm, normal volume, normal tone  Mood: Depressed  Affect: mood-congruent  Thought Process: linear  Associations: intact  Thought Content and Perceptions: (+)auditory  and visual hallucinations, no suicidal ideation, no homicidal ideation  Recent and Remote Memory: recent memory intact, remote memory intact; per interview/observation with patient  Attention and Concentration: fair, attentive to conversation; per interview/observation with patient  Fund of Knowledge: fair, aware of current events, vocabulary appropriate; based on history, vocabulary, fund of knowledge, syntax, grammar, and content  Insight: questionable; based on understanding of severity of illness and HPI  Judgment: questionable; based on patient's behavior and HPI        Patient Strengths:  Access to care, Able to verbalize needs, and Motivation for treatment      Patient Liabilities:  Substance use, Psychosis, and Housing stressors      Discharge Criteria:  Improved thought process, Medication compliance, Overall functional improvement, and Improved coping skills      Reason for Admission:  The psychiatric disorder requires intensive treatment that necessitates 24 hour observation and care., To stabilize an acute exacerbation of a severe persistent mental disorder., To stabilize the decompensation of a mental disorder that severely interferes with patient's functioning., and The patient can demonstrate a reasonable expectation of improvement in his/her disorder or condition as a result of active treatment being provided.    ASSESSMENT/PLAN:   Diagnoses:  Unspecified Psychotic Disorder (F29)  Cocaine use disorder (F14.20)  Cannabis use disorder (F12.20)      Past Medical History:   Diagnosis Date    Hypertension           Problem lists and Management Plans:  -Admit to [facility]  -Will attempt to obtain outside psychiatric records if available  - to assist with aftercare planning and collateral  -Continue inpatient treatment as evidenced by significant psychotic thought disorder      Estimated length of stay: 5-7 days    Estimated Disposition:  Substance treatment program    Estimated Follow-up: Substance  treatment program      On this date, I have reviewed the medical history and Nursing Assessment, as well as records from referral source.  I have evaluated the mental status of the above named person and concur with the findings of all assessments.  I have provided medical direction for the development of the Treatment Plan.    I conclude that this patient meets admission criteria for inpatient treatment.  I certify that this patient poses a danger to self or others, or would otherwise be considered gravely disabled based on this assessment and/or provided collateral information.     I have provided medical direction for the development of the Treatment plan.  These services will be provided while this patient is under my care and will be based on an individualized plan of care.  The patient can demonstrate a reasonable expectation of improvement in his/her disorder as a result of the active treatment being provided.      Sylvain Pascal M.D.

## 2023-05-17 NOTE — GROUP NOTE
Group Psychotherapy       Group Focus: Communication Skills and Relationship Dynamics   Leisure Exploration: Patient will be provided with several leisure activities to choose from. Patient will be able to attend the group session with an appropriate and positive outlook on life and the group session. Patient will be able to give and receive positive feedback to and from peers and therapist.    Number of patients in attendance: 11    Group Start Time: 1500  Group End Time:  1600  Groups Date: 5/17/2023  Group Topic:  Behavioral Health  Group Department: [facility]  Group Facilitators:  Jennifer Rossi  _____________________________________________________________________    Patient Name: Efren Ren  MRN: [id number]  Patient Class: IP- Psych   Admission Date\Time: 5/17/2023  6:01 AM  Hospital Length of Stay: 0  Primary Care Provider: Primary Doctor No     Referred by:      Target symptoms: Substance Abuse and Psychosis     Patient's response to treatment: Not Participating; Pt did not attend group session. Therapist will provide alternate activity.        Progress toward goals: Not progressing     Interval History:      Diagnosis:      Plan: Therapist will continue to encourage patient to attend daily group sessions. Patient will engage adequately and effectively in group setting.

## 2023-05-17 NOTE — NURSING
Treatment Team Note:      Behavior:    Patient (Efren Ren is a 62 y.o. male, : 1960, MRN: [id number]) demonstrating an affect that was flat. Efren demonstrating mood that is depressed. Efren had an appearance that was disheveled. Efren denies suicidal ideation. Efren denies suicide plan. Efren endorses hallucinations.(auditory) started hearing voices when he got out of rehab and that when the voices started. He has a history of mental health inpatient stay      Intervention:    Encourage Efren to perform self-hygiene, grooming, and changing of clothing. Encourage Efren to attend all scheduled groups. Monitor Efren's behavior and program compliance. Monitor Efren for suicidal ideation, homicidal ideation, sleep disturbance, and hallucinations. Encourage Efren to eat all portions of meals and assess for meal preferences. Monitor Efren for intake and output to ensure hydration. Notify the Physician/Physician Assistant/Advance Practice Registered Nurse (MD/PA/APRN) for any medication refusal and any change in patient condition.    Discussed with Efren course of treatment. Discussed with Efern medications ordered and schedule of medications. Discussed collateral contact with Efren.      Response:    Efren's response to treatment team meeting calm and cooperative. He admits  to relapsing on THC and Cocaine .He is homeless.      Plan: wants to go back to rehab.(Jamari)    Continue to monitor per MD/PA/APRN orders; maintain patient safety.

## 2023-05-17 NOTE — NURSING
Admission Note:    Efren Ren is a 62 y.o. male, : 1960, MRN: [id number], admitted on 2023 to [facility] ([facility]) for Sylvain Pascal MD with a diagnosis of Schizophrenia [F20.9]. Patient admitted on a status of Physician Emergency Certificate (PEC). Efren reports no known food or drug allergies.    Patient demonstrated an affect that was sad. Patient demonstrated mood during assessment that was depressed. Patient had an appearance that was disheveled and poor hygiene.  Patient denies suicidal ideation. Patient denies suicide plan. Patient denies hallucinations.    Efren's  vitals were not taken for this visit.     Efren's last BM was noted on: 23    Metal detector screening performed via security personnel. The result of the scan was negative for contraband. Head-to-toe physical assessment completed with the following findings:  No contraband  found upon body screen. A full skin assessment was performed. Efren's skin appeared intact.  Efren was oriented to unit, staff, peers, and room. Patient belongings/valuables stored in locked intake room cabinet and changes of clothing provided to patient. Efren was placed on Q 15 min observations.

## 2023-05-17 NOTE — PLAN OF CARE
Problem: Adult Inpatient Plan of Care  Goal: Plan of Care Review  Outcome: Ongoing, Not Progressing  Goal: Patient-Specific Goal (Individualized)  Outcome: Ongoing, Not Progressing  Goal: Absence of Hospital-Acquired Illness or Injury  Outcome: Ongoing, Not Progressing  Goal: Optimal Comfort and Wellbeing  Outcome: Ongoing, Not Progressing  Goal: Readiness for Transition of Care  Outcome: Ongoing, Not Progressing     Problem: Behavior Regulation Impairment (Psychotic Signs/Symptoms)  Goal: Improved Behavioral Control (Psychotic Signs/Symptoms)  Outcome: Ongoing, Not Progressing     Problem: Cognitive Impairment (Psychotic Signs/Symptoms)  Goal: Optimal Cognitive Function (Psychotic Signs/Symptoms)  Outcome: Ongoing, Not Progressing     Problem: Decreased Participation and Engagement (Psychotic Signs/Symptoms)  Goal: Increased Participation and Engagement (Psychotic Signs/Symptoms)  Outcome: Ongoing, Not Progressing     Problem: Mood Impairment (Psychotic Signs/Symptoms)  Goal: Improved Mood Symptoms (Psychotic Signs/Symptoms)  Outcome: Ongoing, Not Progressing     Problem: Psychomotor Impairment (Psychotic Signs/Symptoms)  Goal: Improved Psychomotor Symptoms (Psychotic Signs/Symptoms)  Outcome: Ongoing, Not Progressing     Problem: Sensory Perception Impairment (Psychotic Signs/Symptoms)  Goal: Decreased Sensory Symptoms (Psychotic Signs/Symptoms)  Outcome: Ongoing, Not Progressing     Problem: Sleep Disturbance (Psychotic Signs/Symptoms)  Goal: Improved Sleep (Psychotic Signs/Symptoms)  Outcome: Ongoing, Not Progressing     Problem: Social, Occupational or Functional Impairment (Psychotic Signs/Symptoms)  Goal: Enhanced Social, Occupational or Functional Skills (Psychotic Signs/Symptoms)  Outcome: Ongoing, Not Progressing     Problem: Activity and Energy Impairment (Excessive Substance Use)  Goal: Optimized Energy Level (Excessive Substance Use)  Outcome: Ongoing, Not Progressing     Problem: Behavior Regulation  Impairment (Excessive Substance Use)  Goal: Improved Behavioral Control (Excessive Substance Use)  Outcome: Ongoing, Not Progressing     Problem: Decreased Participation and Engagement (Excessive Substance Use)  Goal: Increased Participation and Engagement (Excessive Substance Use)  Outcome: Ongoing, Not Progressing     Problem: Physiologic Impairment (Excessive Substance Use)  Goal: Improved Physiologic Symptoms (Excessive Substance Use)  Outcome: Ongoing, Not Progressing     Problem: Social, Occupational or Functional Impairment (Excessive Substance Use)  Goal: Enhanced Social, Occupational or Functional Skills (Excessive Substance Use)  Outcome: Ongoing, Not Progressing     Problem: Violence Risk or Actual  Goal: Anger and Impulse Control  5/17/2023 1119 by Harinder Chan, RN  Outcome: Ongoing, Not Progressing  5/17/2023 1118 by Harinder Chan, RN  Outcome: Ongoing, Not Progressing

## 2023-05-17 NOTE — PLAN OF CARE
Problem: Adult Inpatient Plan of Care  Goal: Plan of Care Review  Outcome: Ongoing, Progressing  Goal: Patient-Specific Goal (Individualized)  Outcome: Ongoing, Progressing  Goal: Absence of Hospital-Acquired Illness or Injury  Outcome: Ongoing, Progressing  Goal: Optimal Comfort and Wellbeing  Outcome: Ongoing, Progressing  Goal: Readiness for Transition of Care  Outcome: Ongoing, Progressing     Problem: Behavior Regulation Impairment (Psychotic Signs/Symptoms)  Goal: Improved Behavioral Control (Psychotic Signs/Symptoms)  Outcome: Ongoing, Progressing     Problem: Cognitive Impairment (Psychotic Signs/Symptoms)  Goal: Optimal Cognitive Function (Psychotic Signs/Symptoms)  Outcome: Ongoing, Progressing

## 2023-05-17 NOTE — CARE UPDATE
Treatment Team:  Pt appeared to be calm. Pt stated he was receiving care in [place] and was provided medication. Pt stated that the medication caused him to hear/see stuff. Pt wants to receive after care at [facility] in [place] if possible. Pt stated he began using crack cocaine again last night. Pt stated has about 2 prior psych hospitalizations. Pt stated that he also smokes marijuana and he is currently homeless. Pt stated that he is still hearing things that are not there.       Plan:  Therapist will encourage patient to promote a healthy lifestyle and thought process. Therapist will continue to encourage patient to participate in daily group sessions. Patient will continue to attend daily group sessions, while focusing on maintaining a positive and healthy lifestyle.

## 2023-05-17 NOTE — PLAN OF CARE
Problem: Adult Inpatient Plan of Care  Goal: Plan of Care Review  Outcome: Ongoing, Not Progressing  Goal: Patient-Specific Goal (Individualized)  Outcome: Ongoing, Not Progressing  Goal: Absence of Hospital-Acquired Illness or Injury  Outcome: Ongoing, Not Progressing  Goal: Optimal Comfort and Wellbeing  Outcome: Ongoing, Not Progressing  Goal: Readiness for Transition of Care  Outcome: Ongoing, Not Progressing     Problem: Behavior Regulation Impairment (Psychotic Signs/Symptoms)  Goal: Improved Behavioral Control (Psychotic Signs/Symptoms)  Outcome: Ongoing, Not Progressing     Problem: Cognitive Impairment (Psychotic Signs/Symptoms)  Goal: Optimal Cognitive Function (Psychotic Signs/Symptoms)  Outcome: Ongoing, Not Progressing     Problem: Decreased Participation and Engagement (Psychotic Signs/Symptoms)  Goal: Increased Participation and Engagement (Psychotic Signs/Symptoms)  Outcome: Ongoing, Not Progressing     Problem: Mood Impairment (Psychotic Signs/Symptoms)  Goal: Improved Mood Symptoms (Psychotic Signs/Symptoms)  Outcome: Ongoing, Not Progressing     Problem: Psychomotor Impairment (Psychotic Signs/Symptoms)  Goal: Improved Psychomotor Symptoms (Psychotic Signs/Symptoms)  Outcome: Ongoing, Not Progressing     Problem: Sensory Perception Impairment (Psychotic Signs/Symptoms)  Goal: Decreased Sensory Symptoms (Psychotic Signs/Symptoms)  Outcome: Ongoing, Not Progressing     Problem: Sleep Disturbance (Psychotic Signs/Symptoms)  Goal: Improved Sleep (Psychotic Signs/Symptoms)  Outcome: Ongoing, Not Progressing     Problem: Social, Occupational or Functional Impairment (Psychotic Signs/Symptoms)  Goal: Enhanced Social, Occupational or Functional Skills (Psychotic Signs/Symptoms)  Outcome: Ongoing, Not Progressing     Problem: Activity and Energy Impairment (Excessive Substance Use)  Goal: Optimized Energy Level (Excessive Substance Use)  Outcome: Ongoing, Not Progressing     Problem: Behavior Regulation  Impairment (Excessive Substance Use)  Goal: Improved Behavioral Control (Excessive Substance Use)  Outcome: Ongoing, Not Progressing     Problem: Decreased Participation and Engagement (Excessive Substance Use)  Goal: Increased Participation and Engagement (Excessive Substance Use)  Outcome: Ongoing, Not Progressing     Problem: Physiologic Impairment (Excessive Substance Use)  Goal: Improved Physiologic Symptoms (Excessive Substance Use)  Outcome: Ongoing, Not Progressing     Problem: Social, Occupational or Functional Impairment (Excessive Substance Use)  Goal: Enhanced Social, Occupational or Functional Skills (Excessive Substance Use)  Outcome: Ongoing, Not Progressing     Problem: Violence Risk or Actual  Goal: Anger and Impulse Control  Outcome: Ongoing, Not Progressing

## 2023-05-18 LAB
CHOLEST SERPL-MCNC: 211 MG/DL
CHOLEST/HDLC SERPL: 6 {RATIO} (ref 0–5)
EST. AVERAGE GLUCOSE BLD GHB EST-MCNC: 119.8 MG/DL
GLUCOSE P FAST SERPL-MCNC: 97 MG/DL (ref 82–115)
HBA1C MFR BLD: 5.8 %
HDLC SERPL-MCNC: 37 MG/DL (ref 35–60)
LDLC SERPL CALC-MCNC: 154 MG/DL (ref 50–140)
RPR SER QL: NORMAL
RPR SER-TITR: NORMAL {TITER}
T PALLIDUM AB SER QL: REACTIVE
TRIGL SERPL-MCNC: 100 MG/DL (ref 34–140)
VLDLC SERPL CALC-MCNC: 20 MG/DL

## 2023-05-18 PROCEDURE — 11400000 HC PSYCH PRIVATE ROOM

## 2023-05-18 PROCEDURE — 25000003 PHARM REV CODE 250: Performed by: PSYCHIATRY & NEUROLOGY

## 2023-05-18 PROCEDURE — 25000003 PHARM REV CODE 250

## 2023-05-18 PROCEDURE — 86780 TREPONEMA PALLIDUM: CPT | Mod: 90

## 2023-05-18 PROCEDURE — 86780 TREPONEMA PALLIDUM: CPT

## 2023-05-18 PROCEDURE — 82947 ASSAY GLUCOSE BLOOD QUANT: CPT

## 2023-05-18 PROCEDURE — 80061 LIPID PANEL: CPT

## 2023-05-18 PROCEDURE — 86592 SYPHILIS TEST NON-TREP QUAL: CPT

## 2023-05-18 PROCEDURE — 83036 HEMOGLOBIN GLYCOSYLATED A1C: CPT

## 2023-05-18 RX ORDER — SERTRALINE HYDROCHLORIDE 50 MG/1
50 TABLET, FILM COATED ORAL DAILY
Status: DISCONTINUED | OUTPATIENT
Start: 2023-05-18 | End: 2023-05-22 | Stop reason: HOSPADM

## 2023-05-18 RX ADMIN — SERTRALINE HYDROCHLORIDE 50 MG: 50 TABLET ORAL at 10:05

## 2023-05-18 RX ADMIN — ARIPIPRAZOLE 5 MG: 5 TABLET ORAL at 08:05

## 2023-05-18 RX ADMIN — ALUMINUM HYDROXIDE, MAGNESIUM HYDROXIDE, AND SIMETHICONE 30 ML: 200; 200; 20 SUSPENSION ORAL at 02:05

## 2023-05-18 NOTE — GROUP NOTE
Group Psychotherapy       Group Focus: Life Skills   Self-reflect activity: Patient will be able to identify positive and negative character traits. Patient, peers, and therapist will engage in a healthy conversation surrounding the topic self-reflection. Patient will be able to give and receive positive feedback from peers and therapist. Patient will be able to positively and effectively interact and engage in group session.   Number of patients in attendance: 7    Group Start Time: 1000  Group End Time:  1045  Groups Date: 5/18/2023  Group Topic:  Behavioral Health  Group Department: [facility]  Group Facilitators:  Jennifer Rossi  _____________________________________________________________________    Patient Name: Efren Ren  MRN: [id number]  Patient Class: IP- Psych   Admission Date\Time: 5/17/2023  6:01 AM  Hospital Length of Stay: 1  Primary Care Provider: Primary Doctor No     Referred by:      Target symptoms: Mood Disorder     Patient's response to treatment: Active Listening and Self-disclosure     Progress toward goals: Minimal progress     Interval History:      Diagnosis:      Plan: Pt will continue to attend daily group sessions and focus on improving overall health, thought process, behavior, and mood.

## 2023-05-18 NOTE — NURSING
"Daily Nursing Note:      Behavior:    Patient (Efren Ren is a 63 y.o. male, : 1960, MRN: [id number]) demonstrating an affect that was flat. Efren demonstrating mood that is depressed. Efren had an appearance that was poor hygiene. Efren denies suicidal ideation. Efren denies suicide plan. Efren denies homicidal ideation. Efren denies hallucinations.    Efren's  height is 5' 8" (1.727 m) and weight is 95.3 kg (210 lb). His temperature is 97.9 °F (36.6 °C). His blood pressure is 161/58 (abnormal) and his pulse is 69. His respiration is 16 and oxygen saturation is 97%.     Efren's last BM was noted on: _2023______      Intervention:    Encourage Efren to perform self-hygiene, grooming, and changing of clothing. Monitor Efren's behavior and program compliance. Monitor Efren for suicidal ideation, homicidal ideation, sleep disturbance, and hallucinations. Encourage Efren to eat all portions of meals and assess for meal preferences. Monitor Efren for intake and output to ensure hydration. Notify the Physician/Physician Assistant/Advance Practice Registered Nurse (MD/PA/APRN) for any medication refusal and any change in patient condition.      Response:    Efren verbalizes understand of unit process and procedures. Efren reported medications __are "o.k."____.      Plan:     Continue to monitor per MD/PA/APRN orders; maintain patient safety.   "

## 2023-05-18 NOTE — GROUP NOTE
Group Psychotherapy       Group Focus: Boundaries  Group topic: Personal Boundaries: Therapist explored patients need for safe boundaries. Patient was able to discuss what boundaries are and ways to enforce boundaries. Patient continues to make progress towards treatment goals.        Number of patients in attendance: 7    Group Start Time: 1045  Group End Time:  1130  Groups Date: 5/18/2023  Group Topic:  Behavioral Health  Group Department: [facility] [place] [place] [facility]  Group Facilitators:  Fanny Montanez  _____________________________________________________________________    Patient Name: Efren Ren  MRN: [id number]  Patient Class: IP- Psych   Admission Date\Time: 5/17/2023  6:01 AM  Hospital Length of Stay: 1  Primary Care Provider: Primary Doctor No     Referred by: Acute Psychiatry Unit Treatment Team     Target symptoms: Substance Abuse     Patient's response to treatment: Self-disclosure     Progress toward goals: Progressing adequately     Interval History:      Diagnosis:      Plan: Continue treatment on [facility]

## 2023-05-18 NOTE — PROGRESS NOTES
"5/18/2023  Efren Ren   1960   [id number]        Psychiatry Progress Note     Chief Complaint: "So So"    SUBJECTIVE:   Efren Ren is a 63 y.o. male placed under a PEC at [facility] due to auditory and visual hallucinations after unknown drug use last night.    Today patient states that he is experiencing depression. He denies any suicidal thoughts. He does indicate that he has been hearing voices. Patient was started on Abilify yesterday so we will monitor and titrate this as needed. He is tolerating his medication well without issue. He continues to discuss his interest in rehab. Will start Zoloft 50 mg for the depression and monitor for mood improvement.        Current Medications:   Scheduled Meds:    ARIPiprazole  5 mg Oral Daily    nicotine  1 patch Transdermal Daily      PRN Meds: acetaminophen, aluminum-magnesium hydroxide-simethicone, haloperidoL **AND** diphenhydrAMINE **AND** LORazepam **AND** haloperidol lactate **AND** diphenhydrAMINE **AND** lorazepam, hydrOXYzine HCL, trazodone   Psychotherapeutics (From admission, onward)      Start     Stop Route Frequency Ordered    05/17/23 1145  ARIPiprazole tablet 5 mg         -- Oral Daily 05/17/23 1032    05/17/23 0602  haloperidoL tablet 10 mg  (Med - Acute  Behavioral Management)        Question:  Is the patient competent?  Answer:  Yes   See Hyperspace for full Linked Orders Report.    -- Oral Every 6 hours PRN 05/17/23 0602    05/17/23 0602  LORazepam tablet 2 mg  (Med - Acute  Behavioral Management)        Question:  Is the patient competent?  Answer:  Yes   See Hyperspace for full Linked Orders Report.    -- Oral Every 6 hours PRN 05/17/23 0602    05/17/23 0602  haloperidol lactate injection 10 mg  (Med - Acute  Behavioral Management)        See Hyperspace for full Linked Orders Report.    -- IM Every 6 hours PRN 05/17/23 0602    05/17/23 0602  LORazepam injection 2 mg  (Med - Acute  Behavioral Management)        Question:  Is the patient competent?  " Answer:  Yes   See [facility] for full Linked Orders Report.    -- IM Every 6 hours PRN 05/17/23 0602    05/17/23 0602  traZODone tablet 100 mg        Question:  Is the patient competent?  Answer:  Yes    -- Oral Nightly PRN 05/17/23 0602            Allergies:   Review of patient's allergies indicates:  No Known Allergies     OBJECTIVE:   Vitals   Vitals:    05/17/23 1600   BP: (!) 161/58   Pulse: 69   Resp: 16   Temp: 97.9 °F (36.6 °C)        Labs/Imaging/Studies:   Recent Results (from the past 36 hour(s))   Comprehensive metabolic panel    Collection Time: 05/17/23  1:56 AM   Result Value Ref Range    Sodium Level 140 136 - 145 mmol/L    Potassium Level 4.0 3.5 - 5.1 mmol/L    Chloride 109 (H) 98 - 107 mmol/L    Carbon Dioxide 21 (L) 23 - 31 mmol/L    Glucose Level 104 82 - 115 mg/dL    Blood Urea Nitrogen 20.1 8.4 - 25.7 mg/dL    Creatinine 0.97 0.73 - 1.18 mg/dL    Calcium Level Total 10.4 (H) 8.8 - 10.0 mg/dL    Protein Total 8.3 (H) 5.8 - 7.6 gm/dL    Albumin Level 4.2 3.4 - 4.8 g/dL    Globulin 4.1 (H) 2.4 - 3.5 gm/dL    Albumin/Globulin Ratio 1.0 (L) 1.1 - 2.0 ratio    Bilirubin Total 0.5 <=1.5 mg/dL    Alkaline Phosphatase 95 40 - 150 unit/L    Alanine Aminotransferase 73 (H) 0 - 55 unit/L    Aspartate Aminotransferase 39 (H) 5 - 34 unit/L    eGFR >60 mls/min/1.73/m2   TSH    Collection Time: 05/17/23  1:56 AM   Result Value Ref Range    Thyroid Stimulating Hormone 1.403 0.350 - 4.940 uIU/mL   Ethanol    Collection Time: 05/17/23  1:56 AM   Result Value Ref Range    Ethanol Level <10.0 <=10.0 mg/dL   CBC with Differential    Collection Time: 05/17/23  1:56 AM   Result Value Ref Range    WBC 8.79 4.50 - 11.50 x10(3)/mcL    RBC 5.37 4.70 - 6.10 x10(6)/mcL    Hgb 15.4 14.0 - 18.0 g/dL    Hct 47.3 42.0 - 52.0 %    MCV 88.1 80.0 - 94.0 fL    MCH 28.7 27.0 - 31.0 pg    MCHC 32.6 (L) 33.0 - 36.0 g/dL    RDW 14.6 11.5 - 17.0 %    Platelet 296 130 - 400 x10(3)/mcL    MPV 9.5 7.4 - 10.4 fL    Neut % 50.4 %    Lymph  % 37.2 %    Mono % 9.0 %    Eos % 2.4 %    Basophil % 0.8 %    Lymph # 3.27 0.6 - 4.6 x10(3)/mcL    Neut # 4.43 2.1 - 9.2 x10(3)/mcL    Mono # 0.79 0.1 - 1.3 x10(3)/mcL    Eos # 0.21 0 - 0.9 x10(3)/mcL    Baso # 0.07 <=0.2 x10(3)/mcL    IG# 0.02 0 - 0.04 x10(3)/mcL    IG% 0.2 %    NRBC% 0.0 %   Urinalysis, Reflex to Urine Culture    Collection Time: 05/17/23  1:58 AM    Specimen: Urine   Result Value Ref Range    Color, UA Yellow Yellow, Light-Yellow, Dark Yellow, Love, Straw    Appearance, UA Clear Clear    Specific Gravity, UA 1.032     pH, UA 5.0 5.0 - 8.5    Protein, UA 1+ (A) Negative mg/dL    Glucose, UA Normal Negative, Normal mg/dL    Ketones, UA Negative Negative mg/dL    Blood, UA Negative Negative unit/L    Bilirubin, UA Negative Negative mg/dL    Urobilinogen, UA 1+ (A) 0.2, 1.0, Normal mg/dL    Nitrites, UA Negative Negative    Leukocyte Esterase, UA Negative Negative unit/L    WBC, UA 6-10 (A) None Seen, 0-2, 3-5, 0-5 /HPF    Bacteria, UA None Seen None Seen /HPF    Squamous Epithelial Cells, UA Trace (A) None Seen /HPF    Mucous, UA Few (A) None Seen /LPF    Hyaline Casts, UA 0-2 (A) None Seen /lpf    Unclassified Crystal, UA Trace (A) None Seen /HPF    RBC, UA 0-5 None Seen, 0-2, 3-5, 0-5 /HPF   Drug Screen, Urine    Collection Time: 05/17/23  1:58 AM   Result Value Ref Range    Amphetamines, Urine Negative Negative    Barbituates, Urine Negative Negative    Benzodiazepine, Urine Negative Negative    Cannabinoids, Urine Positive (A) Negative    Cocaine, Urine Positive (A) Negative    Fentanyl, Urine Negative Negative    MDMA, Urine Negative Negative    Opiates, Urine Negative Negative    Phencyclidine, Urine Negative Negative    pH, Urine 5.0 3.0 - 11.0   COVID-19 Rapid Screening    Collection Time: 05/17/23  1:58 AM   Result Value Ref Range    SARS COV-2 MOLECULAR Negative Negative   Hemoglobin A1C    Collection Time: 05/18/23  7:02 AM   Result Value Ref Range    Hemoglobin A1c 5.8 <=7.0 %     Estimated Average Glucose 119.8 mg/dL   Glucose, Fasting    Collection Time: 05/18/23  7:02 AM   Result Value Ref Range    Glucose Fasting 97 82 - 115 mg/dL   Lipid Panel    Collection Time: 05/18/23  7:02 AM   Result Value Ref Range    Cholesterol Total 211 (H) <=200 mg/dL    HDL Cholesterol 37 35 - 60 mg/dL    Triglyceride 100 34 - 140 mg/dL    Cholesterol/HDL Ratio 6 (H) 0 - 5    Very Low Density Lipoprotein 20     LDL Cholesterol 154.00 (H) 50.00 - 140.00 mg/dL          Medical Review Of Systems:  A comprehensive review of systems was negative.      Psychiatric Mental Status Exam:  General Appearance: appears stated age, well-developed, well-nourished  Arousal: alert  Behavior: cooperative  Movements and Motor Activity: no abnormal involuntary movements noted  Orientation: oriented to person, place, time, and situation  Speech: normal rate, normal rhythm, normal volume, normal tone  Mood: Depressed  Affect: mood-congruent  Thought Process: linear  Associations: intact  Thought Content and Perceptions: (+)auditory and visual hallucinations, no suicidal ideation, no homicidal ideation  Recent and Remote Memory: recent memory intact, remote memory intact; per interview/observation with patient  Attention and Concentration: fair, attentive to conversation; per interview/observation with patient  Fund of Knowledge: fair, aware of current events, vocabulary appropriate; based on history, vocabulary, fund of knowledge, syntax, grammar, and content  Insight: questionable; based on understanding of severity of illness and HPI  Judgment: questionable; based on patient's behavior and HPI    ASSESSMENT/PLAN:   Problems Addressed/Diagnoses:  Unspecified Psychotic Disorder (F29)  Mood disorder NOS (F39)  Cocaine use disorder (F14.20)  Cannabis use disorder (F12.20)    Past Medical History:   Diagnosis Date    Hypertension         Plan:  Psychosis  -Continue Abilify    Mood disorder  -Zoloft 50 mg    Expected Disposition Plan:  Substance treatment program        [facility]el[facility] Far[facility]

## 2023-05-18 NOTE — PLAN OF CARE
Problem: Adult Inpatient Plan of Care  Goal: Plan of Care Review  Outcome: Ongoing, Progressing  Goal: Patient-Specific Goal (Individualized)  Outcome: Ongoing, Progressing  Goal: Absence of Hospital-Acquired Illness or Injury  Outcome: Ongoing, Progressing  Goal: Optimal Comfort and Wellbeing  Outcome: Ongoing, Progressing  Goal: Readiness for Transition of Care  Outcome: Ongoing, Progressing     Problem: Behavior Regulation Impairment (Psychotic Signs/Symptoms)  Goal: Improved Behavioral Control (Psychotic Signs/Symptoms)  Outcome: Ongoing, Progressing     Problem: Cognitive Impairment (Psychotic Signs/Symptoms)  Goal: Optimal Cognitive Function (Psychotic Signs/Symptoms)  Outcome: Ongoing, Progressing     Problem: Decreased Participation and Engagement (Psychotic Signs/Symptoms)  Goal: Increased Participation and Engagement (Psychotic Signs/Symptoms)  Outcome: Ongoing, Progressing     Problem: Mood Impairment (Psychotic Signs/Symptoms)  Goal: Improved Mood Symptoms (Psychotic Signs/Symptoms)  Outcome: Ongoing, Progressing     Problem: Psychomotor Impairment (Psychotic Signs/Symptoms)  Goal: Improved Psychomotor Symptoms (Psychotic Signs/Symptoms)  Outcome: Ongoing, Progressing     Problem: Sensory Perception Impairment (Psychotic Signs/Symptoms)  Goal: Decreased Sensory Symptoms (Psychotic Signs/Symptoms)  Outcome: Ongoing, Progressing     Problem: Sleep Disturbance (Psychotic Signs/Symptoms)  Goal: Improved Sleep (Psychotic Signs/Symptoms)  Outcome: Ongoing, Progressing     Problem: Social, Occupational or Functional Impairment (Psychotic Signs/Symptoms)  Goal: Enhanced Social, Occupational or Functional Skills (Psychotic Signs/Symptoms)  Outcome: Ongoing, Progressing     Problem: Activity and Energy Impairment (Excessive Substance Use)  Goal: Optimized Energy Level (Excessive Substance Use)  Outcome: Ongoing, Progressing     Problem: Behavior Regulation Impairment (Excessive Substance Use)  Goal: Improved  Behavioral Control (Excessive Substance Use)  Outcome: Ongoing, Progressing     Problem: Decreased Participation and Engagement (Excessive Substance Use)  Goal: Increased Participation and Engagement (Excessive Substance Use)  Outcome: Ongoing, Progressing     Problem: Physiologic Impairment (Excessive Substance Use)  Goal: Improved Physiologic Symptoms (Excessive Substance Use)  Outcome: Ongoing, Progressing     Problem: Social, Occupational or Functional Impairment (Excessive Substance Use)  Goal: Enhanced Social, Occupational or Functional Skills (Excessive Substance Use)  Outcome: Ongoing, Progressing     Problem: Violence Risk or Actual  Goal: Anger and Impulse Control  Outcome: Ongoing, Progressing

## 2023-05-18 NOTE — NURSING
"Admission Note:    Efren Ren is a 63 y.o. male, : 1960, MRN: [id number], admitted on 2023 to [facility] ([facility]) for Sylvain Pascal MD with a diagnosis of Schizophrenia [F20.9]. Patient admitted on a status of Physician Emergency Certificate (PEC). Efren reports no known food or drug allergies.    Patient demonstrated an affect that was flat and anxious. Patient demonstrated mood during assessment that was anxious. Patient had an appearance that was disheveled.  Patient denies suicidal ideation. Patient denies suicide plan. Patient endorses hallucinations.    Efren's  height is 5' 8" (1.727 m) and weight is 95.3 kg (210 lb). His temperature is 97.9 °F (36.6 °C). His blood pressure is 161/58 (abnormal) and his pulse is 69. His respiration is 16 and oxygen saturation is 97%.     Efren's last BM was noted on: _______    Metal detector screening performed via security personnel. The result of the scan was _______. Head-to-toe physical assessment completed with the following findings:  ________ found upon body screen. A full skin assessment was performed. Efren's skin appeared _______.  Efren was oriented to unit, staff, peers, and room. Patient belongings/valuables stored in locked intake room cabinet and changes of clothing provided to patient. Efren was placed on Q 15 min observations.      "

## 2023-05-18 NOTE — NURSING
"Daily Nursing Note:      Behavior:    Patient (Efren Ren is a 62 y.o. male, : 1960, MRN: [id number]) demonstrating an affect that was flat and anxious. Efren demonstrating mood that is anxious. Efren had an appearance that was poor hygiene. Efren denies suicidal ideation. Efren denies suicide plan. Efren denies homicidal ideation. Efren endorses hallucinations.    Efren's  height is 5' 8" (1.727 m) and weight is 95.3 kg (210 lb). His temperature is 97.9 °F (36.6 °C). His blood pressure is 161/58 (abnormal) and his pulse is 69. His respiration is 16 and oxygen saturation is 97%.     Efren's last BM was noted on: _______      Intervention:    Encourage Efren to perform self-hygiene, grooming, and changing of clothing. Monitor Efren's behavior and program compliance. Monitor Efren for suicidal ideation, homicidal ideation, sleep disturbance, and hallucinations. Encourage Efren to eat all portions of meals and assess for meal preferences. Monitor Efren for intake and output to ensure hydration. Notify the Physician/Physician Assistant/Advance Practice Registered Nurse (MD/PA/APRN) for any medication refusal and any change in patient condition.      Response:    Efren verbalizes understand of unit process and procedures. Efren reported medications ______.      Plan:     Continue to monitor per MD/PA/APRN orders; maintain patient safety.   "

## 2023-05-18 NOTE — GROUP NOTE
Group Psychotherapy       Group Focus: Medication Education.      Number of patients in attendance: 14    Group Start Time: 0800  Group End Time:  0900  Groups Date: 5/18/2023  Group Topic:  Behavioral Health  Group Department: [facility]  Group Facilitators:  Ja Mcfarland RN  _____________________________________________________________________    Patient Name: Efren Ren  MRN: [id number]  Patient Class: IP- Psych   Admission Date\Time: 5/17/2023  6:01 AM  Hospital Length of Stay: 1  Primary Care Provider: Primary Doctor No     Referred by: Acute Psychiatry Unit Treatment Team     Target symptoms: Depression, Anxiety, and Psychosis     Patient's response to treatment: Active Listening     Progress toward goals: Progressing well     Interval History:      Diagnosis: Schizophrenia.     Plan: Continue treatment on [facility]

## 2023-05-19 PROBLEM — F32.A DEPRESSION: Status: ACTIVE | Noted: 2023-05-19

## 2023-05-19 PROCEDURE — 25000003 PHARM REV CODE 250

## 2023-05-19 PROCEDURE — 25000003 PHARM REV CODE 250: Performed by: PSYCHIATRY & NEUROLOGY

## 2023-05-19 PROCEDURE — 11400000 HC PSYCH PRIVATE ROOM

## 2023-05-19 RX ORDER — ARIPIPRAZOLE 5 MG/1
5 TABLET ORAL DAILY
Qty: 30 TABLET | Refills: 0 | Status: ON HOLD | OUTPATIENT
Start: 2023-05-20 | End: 2023-07-06

## 2023-05-19 RX ORDER — SERTRALINE HYDROCHLORIDE 50 MG/1
50 TABLET, FILM COATED ORAL DAILY
Qty: 30 TABLET | Refills: 0 | Status: ON HOLD | OUTPATIENT
Start: 2023-05-20 | End: 2023-07-06

## 2023-05-19 RX ORDER — IBUPROFEN 200 MG
1 TABLET ORAL DAILY
Qty: 28 PATCH | Refills: 0 | Status: SHIPPED | OUTPATIENT
Start: 2023-05-20 | End: 2023-06-17

## 2023-05-19 RX ADMIN — ALUMINUM HYDROXIDE, MAGNESIUM HYDROXIDE, AND SIMETHICONE 30 ML: 200; 200; 20 SUSPENSION ORAL at 10:05

## 2023-05-19 RX ADMIN — SERTRALINE HYDROCHLORIDE 50 MG: 50 TABLET ORAL at 08:05

## 2023-05-19 RX ADMIN — ARIPIPRAZOLE 5 MG: 5 TABLET ORAL at 08:05

## 2023-05-19 NOTE — NURSING
"Behavior:     Patient (Efren Ren is a 63 y.o. male, : 1960, MRN: [id number]) demonstrating an affect that was flat. Efren demonstrating mood that is depressed. Efren had an appearance that was poor hygiene. Efren denies suicidal ideation. Efren denies suicide plan. Efren denies homicidal ideation. Efren denies hallucinations.          Efren's last BM was noted on: _2023______        Intervention:     Encourage Efren to perform self-hygiene, grooming, and changing of clothing. Monitor Efren's behavior and program compliance. Monitor Efren for suicidal ideation, homicidal ideation, sleep disturbance, and hallucinations. Encourage Efren to eat all portions of meals and assess for meal preferences. Monitor Efren for intake and output to ensure hydration. Notify the Physician/Physician Assistant/Advance Practice Registered Nurse (MD/PA/APRN) for any medication refusal and any change in patient condition.        Response:     Efren verbalizes understand of unit process and procedures. Efren reported medications __are "o.k."____.        Plan:      Continue to monitor per MD/PA/APRN orders; maintain patient safety.   "

## 2023-05-19 NOTE — GROUP NOTE
Group Psychotherapy       Group Focus: Communication Skills, Promoting Healthy Lifestyles, Relationship Dynamics, and Life Skills   Social Skills Activity: Patient will receive a prompt about social skills. Pt will be able to actively and effectively engage in group and peer interaction. Patient will be able to independently participate in group session. Patient will maintain positive attitude and behavior during the group session. Patient will be able to give and receive positive feedback during the group session.    Number of patients in attendance: 7    Group Start Time: 1000  Group End Time:  1045  Groups Date: 5/19/2023  Group Topic:  Behavioral Health  Group Department: [facility]  Group Facilitators:  Jennifer Rossi  _____________________________________________________________________    Patient Name: Efren Ren  MRN: [id number]  Patient Class: IP- Psych   Admission Date\Time: 5/17/2023  6:01 AM  Hospital Length of Stay: 2  Primary Care Provider: Primary Doctor No     Referred by:      Target symptoms: Mood Disorder     Patient's response to treatment: Active Listening, Self-disclosure, and Feedback given to another patient     Progress toward goals: Progressing well     Interval History:      Diagnosis:      Plan: Pt will continue to attend daily group sessions and focus on improving overall health, thought process, behavior, and mood.

## 2023-05-19 NOTE — PROGRESS NOTES
"5/19/2023  Efren Ren   1960   [id number]        Psychiatry Progress Note     Chief Complaint: "So So"    SUBJECTIVE:   Efren Ren is a 63 y.o. male placed under a PEC at [facility] due to auditory and visual hallucinations after unknown drug use last night.    Today patient states that he states that he feels like his old self. He denies any suicidal thoughts. He denies hearing voices today. Patient was started on Abilify yesterday so we will monitor and titrate this as needed. He is tolerating his medication well without issue. Today he states that he wants to go back home rather than rehab so that he can get back to his work.  Tolerating medications well without issue. Will plan for discharge on 5/22.         Current Medications:   Scheduled Meds:    ARIPiprazole  5 mg Oral Daily    nicotine  1 patch Transdermal Daily    sertraline  50 mg Oral Daily      PRN Meds: acetaminophen, aluminum-magnesium hydroxide-simethicone, haloperidoL **AND** diphenhydrAMINE **AND** LORazepam **AND** haloperidol lactate **AND** diphenhydrAMINE **AND** lorazepam, hydrOXYzine HCL, trazodone   Psychotherapeutics (From admission, onward)      Start     Stop Route Frequency Ordered    05/18/23 1115  sertraline tablet 50 mg         -- Oral Daily 05/18/23 1006    05/17/23 1145  ARIPiprazole tablet 5 mg         -- Oral Daily 05/17/23 1032    05/17/23 0602  haloperidoL tablet 10 mg  (Med - Acute  Behavioral Management)        Question:  Is the patient competent?  Answer:  Yes   See Hyperspace for full Linked Orders Report.    -- Oral Every 6 hours PRN 05/17/23 0602    05/17/23 0602  LORazepam tablet 2 mg  (Med - Acute  Behavioral Management)        Question:  Is the patient competent?  Answer:  Yes   See Hyperspace for full Linked Orders Report.    -- Oral Every 6 hours PRN 05/17/23 0602    05/17/23 0602  haloperidol lactate injection 10 mg  (Med - Acute  Behavioral Management)        See Hyperspace for full Linked Orders Report.    -- " IM Every 6 hours PRN 05/17/23 0602    05/17/23 0602  LORazepam injection 2 mg  (Med - Acute  Behavioral Management)        Question:  Is the patient competent?  Answer:  Yes   See Rebecca for full Linked Orders Report.    -- IM Every 6 hours PRN 05/17/23 0602    05/17/23 0602  traZODone tablet 100 mg        Question:  Is the patient competent?  Answer:  Yes    -- Oral Nightly PRN 05/17/23 0602            Allergies:   Review of patient's allergies indicates:  No Known Allergies     OBJECTIVE:   Vitals   Vitals:    05/19/23 0701   BP: (!) 152/82   Pulse: 79   Resp: 18   Temp: 97.6 °F (36.4 °C)        Labs/Imaging/Studies:   Recent Results (from the past 36 hour(s))   Hemoglobin A1C    Collection Time: 05/18/23  7:02 AM   Result Value Ref Range    Hemoglobin A1c 5.8 <=7.0 %    Estimated Average Glucose 119.8 mg/dL   Glucose, Fasting    Collection Time: 05/18/23  7:02 AM   Result Value Ref Range    Glucose Fasting 97 82 - 115 mg/dL   Lipid Panel    Collection Time: 05/18/23  7:02 AM   Result Value Ref Range    Cholesterol Total 211 (H) <=200 mg/dL    HDL Cholesterol 37 35 - 60 mg/dL    Triglyceride 100 34 - 140 mg/dL    Cholesterol/HDL Ratio 6 (H) 0 - 5    Very Low Density Lipoprotein 20     LDL Cholesterol 154.00 (H) 50.00 - 140.00 mg/dL   SYPHILIS ANTIBODY (WITH REFLEX RPR)    Collection Time: 05/18/23  7:02 AM   Result Value Ref Range    Syphilis Antibody Reactive (A) Nonreactive, Equivocal   RPR    Collection Time: 05/18/23  7:02 AM   Result Value Ref Range    RPR Non-Reactive Non-Reactive    RPR Titer            Medical Review Of Systems:  A comprehensive review of systems was negative.      Psychiatric Mental Status Exam:  General Appearance: appears stated age, well-developed, well-nourished  Arousal: alert  Behavior: cooperative  Movements and Motor Activity: no abnormal involuntary movements noted  Orientation: oriented to person, place, time, and situation  Speech: normal rate, normal rhythm, normal volume,  "normal tone  Mood: "great"  Affect: mood-congruent  Thought Process: linear  Associations: intact  Thought Content and Perceptions: denies auditory and visual hallucinations, no suicidal ideation, no homicidal ideation  Recent and Remote Memory: recent memory intact, remote memory intact; per interview/observation with patient  Attention and Concentration: fair, attentive to conversation; per interview/observation with patient  Fund of Knowledge: fair, aware of current events, vocabulary appropriate; based on history, vocabulary, fund of knowledge, syntax, grammar, and content  Insight: questionable; based on understanding of severity of illness and HPI  Judgment: questionable; based on patient's behavior and HPI    ASSESSMENT/PLAN:   Problems Addressed/Diagnoses:  Unspecified Psychotic Disorder (F29)  Mood disorder NOS (F39)  Cocaine use disorder (F14.20)  Cannabis use disorder (F12.20)    Past Medical History:   Diagnosis Date    Hypertension         Plan:  Psychosis  -Continue Abilify    Mood disorder  -Continue Zoloft 50 mg    Expected Disposition Plan: Substance treatment program        Cornelius Farley [facility]  "

## 2023-05-19 NOTE — GROUP NOTE
Group Psychotherapy       Group Focus: Leisure Exploration   Leisure Exploration: Patient will be provided with several leisure activities to choose from. Patient will be able to attend the group session with an appropriate and positive outlook on life and the group session. Patient will be able to give and receive positive feedback to and from peers and therapist.    Number of patients in attendance: 10    Group Start Time: 1500  Group End Time:  1530  Groups Date: 5/19/2023  Group Topic:  Behavioral Health  Group Department: [facility]  Group Facilitators:  Jennifer Rossi  _____________________________________________________________________    Patient Name: Efren Ren  MRN: [id number]  Patient Class: IP- Psych   Admission Date\Time: 5/17/2023  6:01 AM  Hospital Length of Stay: 2  Primary Care Provider: Primary Doctor No     Referred by:      Target symptoms: Mood Disorder     Patient's response to treatment: Not Participating     Progress toward goals: Not progressing     Interval History:      Diagnosis:     Plan: Therapist will continue to encourage patient to attend daily group sessions. Patient will engage adequately and effectively in group setting.

## 2023-05-19 NOTE — GROUP NOTE
Group Psychotherapy       Group Focus: Medication Education.      Number of patients in attendance: 14    Group Start Time: 0800  Group End Time:  0900  Groups Date: 5/19/2023  Group Topic:  Behavioral Health  Group Department: [facility]  Group Facilitators:  Ja Mcfarland RN  _____________________________________________________________________    Patient Name: Efren Ren  MRN: [id number]  Patient Class: IP- Psych   Admission Date\Time: 5/17/2023  6:01 AM  Hospital Length of Stay: 2  Primary Care Provider: Primary Doctor No     Referred by: Acute Psychiatry Unit Treatment Team     Target symptoms: Depression and Anxiety     Patient's response to treatment: Active Listening     Progress toward goals: Progressing well     Interval History:      Diagnosis: Schizophrenia.     Plan: Continue treatment on [facility]

## 2023-05-19 NOTE — NURSING
"Daily Nursing Note:      Behavior:    Patient (Efren Ren is a 63 y.o. male, : 1960, MRN: [id number]) demonstrating an affect that was congruent. Efren demonstrating mood that is pleasant and appropriate. Efren had an appearance that was disheveled. Efren denies suicidal ideation. Efren denies suicide plan. Efren denies homicidal ideation. Efren denies hallucinations.    Efren's  height is 5' 8" (1.727 m) and weight is 95.3 kg (210 lb). His temperature is 97.6 °F (36.4 °C). His blood pressure is 152/82 (abnormal) and his pulse is 79. His respiration is 18 and oxygen saturation is 96%.     Efren's last BM was noted on: 23_______      Intervention:    Encourage Efren to perform self-hygiene, grooming, and changing of clothing. Monitor Efren's behavior and program compliance. Monitor Efren for suicidal ideation, homicidal ideation, sleep disturbance, and hallucinations. Encourage Efren to eat all portions of meals and assess for meal preferences. Monitor Efren for intake and output to ensure hydration. Notify the Physician/Physician Assistant/Advance Practice Registered Nurse (MD/PA/APRN) for any medication refusal and any change in patient condition.      Response:    Efren verbalizes understand of unit process and procedures. Efren reported medications are helping somewhat.______.      Plan:     Continue to monitor per MD/PA/APRN orders; maintain patient safety.   "

## 2023-05-20 PROCEDURE — 11400000 HC PSYCH PRIVATE ROOM

## 2023-05-20 PROCEDURE — 25000003 PHARM REV CODE 250

## 2023-05-20 PROCEDURE — 25000003 PHARM REV CODE 250: Performed by: PSYCHIATRY & NEUROLOGY

## 2023-05-20 RX ADMIN — SERTRALINE HYDROCHLORIDE 50 MG: 50 TABLET ORAL at 08:05

## 2023-05-20 RX ADMIN — ALUMINUM HYDROXIDE, MAGNESIUM HYDROXIDE, AND SIMETHICONE 30 ML: 200; 200; 20 SUSPENSION ORAL at 08:05

## 2023-05-20 RX ADMIN — ARIPIPRAZOLE 5 MG: 5 TABLET ORAL at 08:05

## 2023-05-20 RX ADMIN — ALUMINUM HYDROXIDE, MAGNESIUM HYDROXIDE, AND SIMETHICONE 30 ML: 200; 200; 20 SUSPENSION ORAL at 06:05

## 2023-05-20 NOTE — NURSING
"Daily Nursing Note:      Behavior:    Patient (Efren Ren is a 63 y.o. male, : 1960, MRN: [id number]) demonstrating an affect that was congruent. Efren demonstrating mood that is anxious. Efren had an appearance that was disheveled and poor hygiene. Efren denies suicidal ideation. Efren denies suicide plan. Efren denies homicidal ideation. Efren denies hallucinations.    Efren's  height is 5' 8" (1.727 m) and weight is 95.3 kg (210 lb). His temperature is 97.9 °F (36.6 °C). His blood pressure is 145/80 (abnormal) and his pulse is 70. His respiration is 18 and oxygen saturation is 97%.     Efren's last BM was noted on: ___2023____      Intervention:    Encourage Efren to perform self-hygiene, grooming, and changing of clothing. Monitor Efren's behavior and program compliance. Monitor Efren for suicidal ideation, homicidal ideation, sleep disturbance, and hallucinations. Encourage Efren to eat all portions of meals and assess for meal preferences. Monitor Efren for intake and output to ensure hydration. Notify the Physician/Physician Assistant/Advance Practice Registered Nurse (MD/PA/APRN) for any medication refusal and any change in patient condition.      Response:    Efren verbalizes understand of unit process and procedures. Efren reported medications effectiveness with decreasing anxiety, depression and psychosis._____.      Plan:     Continue to monitor per MD/PA/APRN orders; maintain patient safety.    "

## 2023-05-20 NOTE — NURSING
Efren is pleasant and cooperative. Efren denies suicidal ideation, suicidal plans, homicidal, or hallucinations. Efren is dressed appropriately.

## 2023-05-20 NOTE — GROUP NOTE
Group Psychotherapy       Group Focus: Psychiatric Medication Education      Number of patients in attendance: 16    Group Start Time: 2030  Group End Time:  2100  Groups Date: 5/19/2023  Group Topic:  Behavioral Health  Group Department: [facility]  Group Facilitators:  Kavitha Diane RN  _____________________________________________________________________    Patient Name: Efren Ren  MRN: [id number]  Patient Class: IP- Psych   Admission Date\Time: 5/17/2023  6:01 AM  Hospital Length of Stay: 3  Primary Care Provider: Primary Doctor No     Referred by: Acute Psychiatry Unit Treatment Team     Target symptoms: Psychosis     Patient's response to treatment: Active Listening     Progress toward goals: Progressing slowly     Diagnosis: Psychosis     Plan: Continue treatment on [facility]

## 2023-05-20 NOTE — GROUP NOTE
Group Psychotherapy       Group Focus: Medication Education.      Number of patients in attendance: 16    Group Start Time: 0800  Group End Time:  0900  Groups Date: 5/20/2023  Group Topic:  Behavioral Health  Group Department: [facility]  Group Facilitators:  Ja Mcfarland RN  _____________________________________________________________________    Patient Name: Efren Ren  MRN: [id number]  Patient Class: IP- Psych   Admission Date\Time: 5/17/2023  6:01 AM  Hospital Length of Stay: 3  Primary Care Provider: Primary Doctor No     Referred by: Acute Psychiatry Unit Treatment Team     Target symptoms: Depression and Anxiety     Patient's response to treatment: Active Listening     Progress toward goals: Progressing well     Interval History:      Diagnosis: Depression.     Plan: Continue treatment on [facility]

## 2023-05-20 NOTE — PLAN OF CARE
Problem: Behavior Regulation Impairment (Psychotic Signs/Symptoms)  Goal: Improved Behavioral Control (Psychotic Signs/Symptoms)  Outcome: Ongoing, Progressing     Problem: Cognitive Impairment (Psychotic Signs/Symptoms)  Goal: Optimal Cognitive Function (Psychotic Signs/Symptoms)  Outcome: Ongoing, Progressing     Problem: Decreased Participation and Engagement (Psychotic Signs/Symptoms)  Goal: Increased Participation and Engagement (Psychotic Signs/Symptoms)  Outcome: Ongoing, Progressing     Problem: Mood Impairment (Psychotic Signs/Symptoms)  Goal: Improved Mood Symptoms (Psychotic Signs/Symptoms)  Outcome: Ongoing, Progressing     Problem: Psychomotor Impairment (Psychotic Signs/Symptoms)  Goal: Improved Psychomotor Symptoms (Psychotic Signs/Symptoms)  Outcome: Ongoing, Progressing     Problem: Sensory Perception Impairment (Psychotic Signs/Symptoms)  Goal: Decreased Sensory Symptoms (Psychotic Signs/Symptoms)  Outcome: Ongoing, Progressing     Problem: Sleep Disturbance (Psychotic Signs/Symptoms)  Goal: Improved Sleep (Psychotic Signs/Symptoms)  Outcome: Ongoing, Progressing     Problem: Social, Occupational or Functional Impairment (Psychotic Signs/Symptoms)  Goal: Enhanced Social, Occupational or Functional Skills (Psychotic Signs/Symptoms)  Outcome: Ongoing, Progressing     Problem: Activity and Energy Impairment (Excessive Substance Use)  Goal: Optimized Energy Level (Excessive Substance Use)  Outcome: Ongoing, Progressing     Problem: Behavior Regulation Impairment (Excessive Substance Use)  Goal: Improved Behavioral Control (Excessive Substance Use)  Outcome: Ongoing, Progressing     Problem: Decreased Participation and Engagement (Excessive Substance Use)  Goal: Increased Participation and Engagement (Excessive Substance Use)  Outcome: Ongoing, Progressing     Problem: Physiologic Impairment (Excessive Substance Use)  Goal: Improved Physiologic Symptoms (Excessive Substance Use)  Outcome: Ongoing,  Progressing     Problem: Social, Occupational or Functional Impairment (Excessive Substance Use)  Goal: Enhanced Social, Occupational or Functional Skills (Excessive Substance Use)  Outcome: Ongoing, Progressing     Problem: Violence Risk or Actual  Goal: Anger and Impulse Control  Outcome: Ongoing, Progressing     Problem: Adult Inpatient Plan of Care  Goal: Plan of Care Review  Outcome: Met  Goal: Patient-Specific Goal (Individualized)  Outcome: Met  Goal: Absence of Hospital-Acquired Illness or Injury  Outcome: Met  Goal: Optimal Comfort and Wellbeing  Outcome: Met  Goal: Readiness for Transition of Care  Outcome: Met

## 2023-05-21 PROCEDURE — 25000003 PHARM REV CODE 250: Performed by: PSYCHIATRY & NEUROLOGY

## 2023-05-21 PROCEDURE — 25000003 PHARM REV CODE 250

## 2023-05-21 PROCEDURE — 25000003 PHARM REV CODE 250: Performed by: FAMILY MEDICINE

## 2023-05-21 PROCEDURE — 11400000 HC PSYCH PRIVATE ROOM

## 2023-05-21 RX ORDER — MAG HYDROX/ALUMINUM HYD/SIMETH 200-200-20
30 SUSPENSION, ORAL (FINAL DOSE FORM) ORAL
Status: DISCONTINUED | OUTPATIENT
Start: 2023-05-21 | End: 2023-05-22 | Stop reason: HOSPADM

## 2023-05-21 RX ORDER — PANTOPRAZOLE SODIUM 40 MG/1
40 TABLET, DELAYED RELEASE ORAL DAILY
Status: DISCONTINUED | OUTPATIENT
Start: 2023-05-21 | End: 2023-05-22 | Stop reason: HOSPADM

## 2023-05-21 RX ORDER — FAMOTIDINE 20 MG/1
40 TABLET, FILM COATED ORAL NIGHTLY
Status: DISCONTINUED | OUTPATIENT
Start: 2023-05-21 | End: 2023-05-22 | Stop reason: HOSPADM

## 2023-05-21 RX ORDER — MAG HYDROX/ALUMINUM HYD/SIMETH 200-200-20
30 SUSPENSION, ORAL (FINAL DOSE FORM) ORAL EVERY 4 HOURS
Status: DISCONTINUED | OUTPATIENT
Start: 2023-05-21 | End: 2023-05-21

## 2023-05-21 RX ADMIN — ALUMINUM HYDROXIDE, MAGNESIUM HYDROXIDE, AND SIMETHICONE 30 ML: 200; 200; 20 SUSPENSION ORAL at 01:05

## 2023-05-21 RX ADMIN — ALUMINUM HYDROXIDE, MAGNESIUM HYDROXIDE, AND SIMETHICONE 30 ML: 200; 200; 20 SUSPENSION ORAL at 05:05

## 2023-05-21 RX ADMIN — FAMOTIDINE 40 MG: 20 TABLET, FILM COATED ORAL at 08:05

## 2023-05-21 RX ADMIN — PANTOPRAZOLE SODIUM 40 MG: 40 TABLET, DELAYED RELEASE ORAL at 11:05

## 2023-05-21 RX ADMIN — SERTRALINE HYDROCHLORIDE 50 MG: 50 TABLET ORAL at 08:05

## 2023-05-21 RX ADMIN — ARIPIPRAZOLE 5 MG: 5 TABLET ORAL at 08:05

## 2023-05-21 NOTE — PLAN OF CARE
Problem: Behavior Regulation Impairment (Psychotic Signs/Symptoms)  Goal: Improved Behavioral Control (Psychotic Signs/Symptoms)  Outcome: Ongoing, Progressing     Problem: Cognitive Impairment (Psychotic Signs/Symptoms)  Goal: Optimal Cognitive Function (Psychotic Signs/Symptoms)  Outcome: Ongoing, Progressing     Problem: Decreased Participation and Engagement (Psychotic Signs/Symptoms)  Goal: Increased Participation and Engagement (Psychotic Signs/Symptoms)  Outcome: Ongoing, Progressing     Problem: Mood Impairment (Psychotic Signs/Symptoms)  Goal: Improved Mood Symptoms (Psychotic Signs/Symptoms)  Outcome: Ongoing, Progressing     Problem: Psychomotor Impairment (Psychotic Signs/Symptoms)  Goal: Improved Psychomotor Symptoms (Psychotic Signs/Symptoms)  Outcome: Ongoing, Progressing

## 2023-05-21 NOTE — GROUP NOTE
Group Psychotherapy       Group Focus: Psychiatric Medication Education      Number of patients in attendance: 16                                                                                                                                                            Group Start Time: 2030  Group End Time:  2100  Groups Date: 5/20/2023  Group Topic:  Behavioral Health  Group Department: [facility]  Group Facilitators:  Kavitha Diane RN  _____________________________________________________________________    Patient Name: Efren Ren  MRN: [id number]  Patient Class: IP- Psych   Admission Date\Time: 5/17/2023  6:01 AM  Hospital Length of Stay: 3  Primary Care Provider: Primary Doctor No     Referred by: Acute Psychiatry Unit Treatment Team     Target symptoms: Depression and Anxiety     Patient's response to treatment: Active Listening     Progress toward goals: Progressing adequately     Diagnosis: Depression     Plan: Continue treatment on [facility]

## 2023-05-21 NOTE — CONSULTS
CONSULT NOTE    Admit Date: 5/17/2023   LOS: 4 days     SUBJECTIVE:     Follow-up For:  HEARTBURN X FEW DAYS  SOME NAUSEA, NO VOMITING. MO HEMATEMESIS. NO MELENA    Scheduled Meds:   ARIPiprazole  5 mg Oral Daily    nicotine  1 patch Transdermal Daily    sertraline  50 mg Oral Daily     Continuous Infusions:  PRN Meds:acetaminophen, aluminum-magnesium hydroxide-simethicone, haloperidoL **AND** diphenhydrAMINE **AND** LORazepam **AND** haloperidol lactate **AND** diphenhydrAMINE **AND** lorazepam, hydrOXYzine HCL, trazodone    Review of patient's allergies indicates:  No Known Allergies    Review of Systems  Review of Systems   Gastrointestinal:  Positive for heartburn and nausea. Negative for blood in stool, constipation, diarrhea, melena and vomiting.      OBJECTIVE:     Vital Signs (Most Recent)  Temp: 97.9 °F (36.6 °C) (05/21/23 0701)  Pulse: 90 (05/21/23 0701)  Resp: 18 (05/21/23 0701)  BP: (!) 150/82 (05/21/23 0701)  SpO2: 100 % (05/21/23 0701)    Vital Signs Range (Last 24H):  Temp:  [97.9 °F (36.6 °C)-98.1 °F (36.7 °C)]   Pulse:  []   Resp:  [18]   BP: (150-171)/(82-91)   SpO2:  [97 %-100 %]     I & O (Last 24H):No intake or output data in the 24 hours ending 05/21/23 1117  Physical Exam:  Physical Exam  Vitals reviewed.   Abdominal:      General: Abdomen is flat. Bowel sounds are normal.      Palpations: Abdomen is soft.      Tenderness: There is no abdominal tenderness.        Laboratory:  No results found for this or any previous visit (from the past 24 hour(s)).         ASSESSMENT/PLAN:     GERD  PROTONIX  PEPCID

## 2023-05-21 NOTE — GROUP NOTE
Group Psychotherapy       Group Focus: Medication Education.      Number of patients in attendance: 17    Group Start Time: 0800  Group End Time:  0900  Groups Date: 5/21/2023  Group Topic:  Behavioral Health  Group Department: [facility]  Group Facilitators:  Ja Mcfarland RN  _____________________________________________________________________    Patient Name: Efren Ren  MRN: [id number]  Patient Class: IP- Psych   Admission Date\Time: 5/17/2023  6:01 AM  Hospital Length of Stay: 4  Primary Care Provider: Primary Doctor No     Referred by: Acute Psychiatry Unit Treatment Team     Target symptoms: Depression and Anxiety     Patient's response to treatment: Active Listening     Progress toward goals: Progressing well     Interval History:      Diagnosis: Depression.     Plan: Continue treatment on [facility]

## 2023-05-21 NOTE — NURSING
"Daily Nursing Note:      Behavior:    Patient (Efren Ren is a 63 y.o. male, : 1960, MRN: [id number]) demonstrating an affect that was congruent. Efren demonstrating mood that is depressed and anxious. Efren had an appearance that was disheveled and poor hygiene. Efren denies suicidal ideation. Efren denies suicide plan. Efren denies homicidal ideation. Efren denies hallucinations.    Efren's  height is 5' 8" (1.727 m) and weight is 95.3 kg (210 lb). His temperature is 97.9 °F (36.6 °C). His blood pressure is 150/82 (abnormal) and his pulse is 90. His respiration is 18 and oxygen saturation is 100%.     Efren's last BM was noted on: ___2023  ____      Intervention:    Encourage Efren to perform self-hygiene, grooming, and changing of clothing. Monitor Efren's behavior and program compliance. Monitor Efren for suicidal ideation, homicidal ideation, sleep disturbance, and hallucinations. Encourage Efren to eat all portions of meals and assess for meal preferences. Monitor Efren for intake and output to ensure hydration. Notify the Physician/Physician Assistant/Advance Practice Registered Nurse (MD/PA/APRN) for any medication refusal and any change in patient condition.      Response:    Efren verbalizes understand of unit process and procedures. Efren reported medication  effectiveness with decreasing anxiety , depression, denies psychosis.___.      Plan:     Continue to monitor per MD/PA/APRN orders; maintain patient safety.    "

## 2023-05-21 NOTE — PLAN OF CARE
Problem: Behavior Regulation Impairment (Psychotic Signs/Symptoms)  Goal: Improved Behavioral Control (Psychotic Signs/Symptoms)  Outcome: Ongoing, Progressing     Problem: Cognitive Impairment (Psychotic Signs/Symptoms)  Goal: Optimal Cognitive Function (Psychotic Signs/Symptoms)  Outcome: Ongoing, Progressing     Problem: Decreased Participation and Engagement (Psychotic Signs/Symptoms)  Goal: Increased Participation and Engagement (Psychotic Signs/Symptoms)  Outcome: Ongoing, Progressing     Problem: Mood Impairment (Psychotic Signs/Symptoms)  Goal: Improved Mood Symptoms (Psychotic Signs/Symptoms)  Outcome: Ongoing, Progressing     Problem: Psychomotor Impairment (Psychotic Signs/Symptoms)  Goal: Improved Psychomotor Symptoms (Psychotic Signs/Symptoms)  Outcome: Ongoing, Progressing     Problem: Sensory Perception Impairment (Psychotic Signs/Symptoms)  Goal: Decreased Sensory Symptoms (Psychotic Signs/Symptoms)  Outcome: Ongoing, Progressing     Problem: Sleep Disturbance (Psychotic Signs/Symptoms)  Goal: Improved Sleep (Psychotic Signs/Symptoms)  Outcome: Ongoing, Progressing     Problem: Social, Occupational or Functional Impairment (Psychotic Signs/Symptoms)  Goal: Enhanced Social, Occupational or Functional Skills (Psychotic Signs/Symptoms)  Outcome: Ongoing, Progressing     Problem: Activity and Energy Impairment (Excessive Substance Use)  Goal: Optimized Energy Level (Excessive Substance Use)  Outcome: Ongoing, Progressing     Problem: Behavior Regulation Impairment (Excessive Substance Use)  Goal: Improved Behavioral Control (Excessive Substance Use)  Outcome: Ongoing, Progressing     Problem: Decreased Participation and Engagement (Excessive Substance Use)  Goal: Increased Participation and Engagement (Excessive Substance Use)  Outcome: Ongoing, Progressing     Problem: Physiologic Impairment (Excessive Substance Use)  Goal: Improved Physiologic Symptoms (Excessive Substance Use)  Outcome: Ongoing,  Progressing     Problem: Social, Occupational or Functional Impairment (Excessive Substance Use)  Goal: Enhanced Social, Occupational or Functional Skills (Excessive Substance Use)  Outcome: Ongoing, Progressing     Problem: Violence Risk or Actual  Goal: Anger and Impulse Control  Outcome: Ongoing, Progressing

## 2023-05-21 NOTE — NURSING
"Daily Nursing Note:      Behavior:    Patient (Efren Ren is a 63 y.o. male, : 1960, MRN: [id number]) demonstrating an affect that was congruent. Efren demonstrating mood that is anxious. Efren had an appearance that was disheveled. Efren denies suicidal ideation. Efren denies suicide plan. Efren denies homicidal ideation. Efren denies hallucinations.    Efren's  height is 5' 8" (1.727 m) and weight is 95.3 kg (210 lb). His temperature is 98.1 °F (36.7 °C). His blood pressure is 171/91 (abnormal) and his pulse is 95. His respiration is 18 and oxygen saturation is 99%.     Efren's last BM was noted on: 23    Intervention:    Encourage Efren to perform self-hygiene, grooming, and changing of clothing. Monitor Efren's behavior and program compliance. Monitor Efren for suicidal ideation, homicidal ideation, sleep disturbance, and hallucinations. Encourage Efren to eat all portions of meals and assess for meal preferences. Monitor Efren for intake and output to ensure hydration. Notify the Physician/Physician Assistant/Advance Practice Registered Nurse (MD/PA/APRN) for any medication refusal and any change in patient condition.      Response:    Efren verbalizes understand of unit process and procedures. Efren reported medications are helping and he's feeling better.      Plan:     Continue to monitor per MD/PA/APRN orders; maintain patient safety.   "

## 2023-05-22 VITALS
SYSTOLIC BLOOD PRESSURE: 153 MMHG | HEART RATE: 99 BPM | WEIGHT: 210 LBS | OXYGEN SATURATION: 98 % | BODY MASS INDEX: 31.83 KG/M2 | DIASTOLIC BLOOD PRESSURE: 96 MMHG | RESPIRATION RATE: 18 BRPM | TEMPERATURE: 98 F | HEIGHT: 68 IN

## 2023-05-22 PROBLEM — F12.20 CANNABIS DEPENDENCE, CONTINUOUS: Status: ACTIVE | Noted: 2023-05-22

## 2023-05-22 PROBLEM — F39 MODERATE MOOD DISORDER: Status: ACTIVE | Noted: 2023-05-22

## 2023-05-22 PROBLEM — F29 PSYCHOTIC DISORDER: Status: ACTIVE | Noted: 2023-05-22

## 2023-05-22 PROBLEM — F14.20 COCAINE DEPENDENCE, CONTINUOUS: Status: ACTIVE | Noted: 2023-05-22

## 2023-05-22 LAB — T PALLIDUM AB SER QL: REACTIVE

## 2023-05-22 PROCEDURE — 25000003 PHARM REV CODE 250: Performed by: PSYCHIATRY & NEUROLOGY

## 2023-05-22 PROCEDURE — 25000003 PHARM REV CODE 250

## 2023-05-22 PROCEDURE — 25000003 PHARM REV CODE 250: Performed by: FAMILY MEDICINE

## 2023-05-22 RX ORDER — PANTOPRAZOLE SODIUM 40 MG/1
40 TABLET, DELAYED RELEASE ORAL DAILY
Qty: 30 TABLET | Refills: 0 | Status: ON HOLD | OUTPATIENT
Start: 2023-05-22 | End: 2023-07-06

## 2023-05-22 RX ORDER — FAMOTIDINE 40 MG/1
40 TABLET, FILM COATED ORAL NIGHTLY
Qty: 30 TABLET | Refills: 0 | Status: ON HOLD | OUTPATIENT
Start: 2023-05-22 | End: 2023-07-06

## 2023-05-22 RX ADMIN — PANTOPRAZOLE SODIUM 40 MG: 40 TABLET, DELAYED RELEASE ORAL at 08:05

## 2023-05-22 RX ADMIN — ALUMINUM HYDROXIDE, MAGNESIUM HYDROXIDE, AND SIMETHICONE 30 ML: 200; 200; 20 SUSPENSION ORAL at 10:05

## 2023-05-22 RX ADMIN — ARIPIPRAZOLE 5 MG: 5 TABLET ORAL at 08:05

## 2023-05-22 RX ADMIN — SERTRALINE HYDROCHLORIDE 50 MG: 50 TABLET ORAL at 08:05

## 2023-05-22 NOTE — NURSING
Discharge Note:    Efren Ren is a 63 y.o. male, : 1960, MRN: [id number], admitted on 2023 for Sylvain Pascal MD with a diagnosis of Schizophrenia [F20.9].    Patient discharged on 2023 per physician orders in stable condition. Patient denied suicidal ideation, homicidal ideation, or hallucinations. Patient was discharged with valuables, personal belongings, prescriptions, discharge instructions, and an educational handout explaining the diagnosis and prescribed medications. Patient verbalized understanding of the discharge instructions and importance of follow-up visits. Patient was escorted out of the facility by [facility] and placed into a secure transport vehicle to be transported to home.     Patient discharged on the following medications:     Medication List        START taking these medications      ARIPiprazole 5 MG Tab  Commonly known as: ABILIFY  Take 1 tablet (5 mg total) by mouth once daily.     famotidine 40 MG tablet  Commonly known as: PEPCID  Take 1 tablet (40 mg total) by mouth nightly.     nicotine 21 mg/24 hr  Commonly known as: NICODERM CQ  Place 1 patch onto the skin once daily.     pantoprazole 40 MG tablet  Commonly known as: PROTONIX  Take 1 tablet (40 mg total) by mouth once daily.     sertraline 50 MG tablet  Commonly known as: ZOLOFT  Take 1 tablet (50 mg total) by mouth once daily.               Where to Get Your Medications        You can get these medications from any pharmacy    Bring a paper prescription for each of these medications  ARIPiprazole 5 MG Tab  famotidine 40 MG tablet  nicotine 21 mg/24 hr  pantoprazole 40 MG tablet  sertraline 50 MG tablet

## 2023-05-22 NOTE — DISCHARGE SUMMARY
"DISCHARGE SUMMARY  PSYCHIATRY      Admit Date: 5/17/2023  6:01 AM    Discharge Date:  5/22/2023    SITE:   [facility]    Discharge Attending Physician: Sylvain Pascal MD    Chief Complaint:  "I still hear things"    History of Present Illness On Admit:   Efren Ren is a 62 y.o. male placed under a PEC at [facility] due to auditory and visual hallucinations after unknown drug use last night.       Patient states that he had just got out of [facility] in [place] on 5/5/23.  Began using again last night (crack cocaine).  He does report that he was having auditory and visual hallucinations and is still having auditory hallucinations.  Was prescribed Abilify and Wellbutrin last filled 4/11/23.  He is interested in going to [facility] in [place] at discharge but is amenable to going back to [facility] if there is no bed at [facility].  He would like to restart his medications.  Admitted for medication management and behavior monitoring.     UDS: (+)cocaine, cannabinoids  Blood alcohol: <10      Admit Mental Status Exam:  General Appearance: appears stated age, well-developed, well-nourished  Arousal: alert  Behavior: cooperative  Movements and Motor Activity: no abnormal involuntary movements noted  Orientation: oriented to person, place, time, and situation  Speech: normal rate, normal rhythm, normal volume, normal tone  Mood: Depressed  Affect: mood-congruent  Thought Process: linear  Associations: intact  Thought Content and Perceptions: (+)auditory and visual hallucinations, no suicidal ideation, no homicidal ideation  Recent and Remote Memory: recent memory intact, remote memory intact; per interview/observation with patient  Attention and Concentration: fair, attentive to conversation; per interview/observation with patient  Fund of Knowledge: fair, aware of current events, vocabulary appropriate; based on history, vocabulary, fund of knowledge, syntax, " grammar, and content  Insight: questionable; based on understanding of severity of illness and HPI  Judgment: questionable; based on patient's behavior and HPI      Diagnoses:  PRINCIPAL PROBLEM:  Psychotic disorder      PROBLEM LIST    Psychotic disorder    Depression    Moderate mood disorder    Cocaine dependence, continuous    Cannabis dependence, continuous        Hospital Course:   Patient was admitted to [facility].    5/18/23  Today patient states that he is experiencing depression. He denies any suicidal thoughts. He does indicate that he has been hearing voices. Patient was started on Abilify yesterday so we will monitor and titrate this as needed. He is tolerating his medication well without issue. He continues to discuss his interest in rehab. Will start Zoloft 50 mg for the depression and monitor for mood improvement.     5/19/23  Today patient states that he states that he feels like his old self. He denies any suicidal thoughts. He denies hearing voices today. Patient was started on Abilify yesterday so we will monitor and titrate this as needed. He is tolerating his medication well without issue. Today he states that he wants to go back home rather than rehab so that he can get back to his work.  Tolerating medications well without issue. Will plan for discharge on 5/22.      5/22/23  Patient due for discharge today.  He was due to go to [place] but is now stating that he wants to return home.  He gives no reason as to why that is the case.  Denies thoughts of self-harm or harm to others.  No tremors, rigidity, extrapyramidal symptoms, or excessive sedation were noted.  Will proceed with discharge.    During the program course he was educated on the dynamic aspects of his disorder.  Individual and group psychotherapy sessions focused on disease process, symptom management, positive coping skills, healthy living skills, leisure skills, and chemical dependency issues.  Nursing groups focused on medications  "and the importance of both medication and treatment compliance.  He achieved maximum benefit of inpatient hospitalization at this level of functioning and was discharged home.  At the time of discharge, no thoughts of self-harm or harm to others noted.  At the time of discharge no tremors, rigidity, extrapyramidal symptoms, or excessive sedation were noted.        DISCHARGE EXAMINATION    VITALS   Vitals:    05/20/23 1100 05/20/23 1600 05/20/23 1900 05/21/23 0701   BP: 135/85 (!) 171/91 (!) 159/82 (!) 150/82   BP Location:   Left arm    Patient Position:   Sitting    Pulse: 88 95 107 90   Resp: 18 18 18 18   Temp: 97.9 °F (36.6 °C) 98.1 °F (36.7 °C) 98.1 °F (36.7 °C) 97.9 °F (36.6 °C)   TempSrc:   Oral    SpO2: 98% 99% 97% 100%   Weight:       Height:             Discharge Mental Status Exam:  General Appearance: appears stated age, well-developed, well-nourished  Arousal: alert  Behavior: cooperative  Movements and Motor Activity: no abnormal involuntary movements noted  Orientation: oriented to person, place, time, and situation  Speech: normal rate, normal rhythm, normal volume, normal tone  Mood: "All right"  Affect: mood-congruent  Thought Process: linear  Associations: intact  Thought Content and Perceptions: no suicidal ideation, no homicidal ideation, no auditory hallucinations, no visual hallucinations, no paranoid ideation, no ideas of reference  Recent and Remote Memory: recent memory intact, remote memory intact; per interview/observation with patient  Attention and Concentration: intact, attentive to conversation; per interview/observation with patient  Fund of Knowledge: intact, aware of current events, vocabulary appropriate; based on history, vocabulary, fund of knowledge, syntax, grammar, and content  Insight: questionable; based on understanding of severity of illness and HPI  Judgment: questionable; based on patient's behavior and HPI      Discharge Condition:  Stable    Prognosis:  Fair based on " condition at discharge    Justification for >1 antipsychotic:  N/a    Disposition:  discharged to home    Follow-up:  [facility]      Medication Regimen:    Current Facility-Administered Medications:     acetaminophen tablet 650 mg, 650 mg, Oral, Q6H PRN, RADHA Herr    aluminum-magnesium hydroxide-simethicone 200-200-20 mg/5 mL suspension 30 mL, 30 mL, Oral, Q4H While awake, BIRDIE Love MD, 30 mL at 05/21/23 1740    ARIPiprazole tablet 5 mg, 5 mg, Oral, Daily, Sylvain Pascal MD, 5 mg at 05/21/23 0816    haloperidoL tablet 10 mg, 10 mg, Oral, Q6H PRN **AND** diphenhydrAMINE capsule 50 mg, 50 mg, Oral, Q6H PRN **AND** LORazepam tablet 2 mg, 2 mg, Oral, Q6H PRN **AND** haloperidol lactate injection 10 mg, 10 mg, Intramuscular, Q6H PRN **AND** diphenhydrAMINE injection 50 mg, 50 mg, Intramuscular, Q6H PRN **AND** LORazepam injection 2 mg, 2 mg, Intramuscular, Q6H PRN, RADHA Herr    famotidine tablet 40 mg, 40 mg, Oral, Nightly, BIRDIE Love MD, 40 mg at 05/21/23 2017    hydrOXYzine tablet 50 mg, 50 mg, Oral, Q6H PRN, RADHA Herr    nicotine 21 mg/24 hr 1 patch, 1 patch, Transdermal, Daily, RADHA Herr    pantoprazole EC tablet 40 mg, 40 mg, Oral, Daily, BIRDIE Love MD, 40 mg at 05/21/23 1146    sertraline tablet 50 mg, 50 mg, Oral, Daily, RADHA Herr, 50 mg at 05/21/23 0816    traZODone tablet 100 mg, 100 mg, Oral, Nightly PRN, RADHA Herr      Patient Instructions:   Continue medication regimen as prescribed.    Disposition plan per  - see  notes for details.    Patient instructed to call 911 or present to emergency department if any of the following complications develop status post discharge: suicidality, homicidality, or grave disability.     Total time spent discharging patient: <30 minutes      Sylvain Pascal M.D.

## 2023-05-22 NOTE — PLAN OF CARE
Problem: Behavior Regulation Impairment (Psychotic Signs/Symptoms)  Goal: Improved Behavioral Control (Psychotic Signs/Symptoms)  Outcome: Met     Problem: Cognitive Impairment (Psychotic Signs/Symptoms)  Goal: Optimal Cognitive Function (Psychotic Signs/Symptoms)  Outcome: Met     Problem: Decreased Participation and Engagement (Psychotic Signs/Symptoms)  Goal: Increased Participation and Engagement (Psychotic Signs/Symptoms)  Outcome: Met     Problem: Mood Impairment (Psychotic Signs/Symptoms)  Goal: Improved Mood Symptoms (Psychotic Signs/Symptoms)  Outcome: Met     Problem: Psychomotor Impairment (Psychotic Signs/Symptoms)  Goal: Improved Psychomotor Symptoms (Psychotic Signs/Symptoms)  Outcome: Met     Problem: Sensory Perception Impairment (Psychotic Signs/Symptoms)  Goal: Decreased Sensory Symptoms (Psychotic Signs/Symptoms)  Outcome: Met     Problem: Sleep Disturbance (Psychotic Signs/Symptoms)  Goal: Improved Sleep (Psychotic Signs/Symptoms)  Outcome: Met     Problem: Social, Occupational or Functional Impairment (Psychotic Signs/Symptoms)  Goal: Enhanced Social, Occupational or Functional Skills (Psychotic Signs/Symptoms)  Outcome: Met     Problem: Activity and Energy Impairment (Excessive Substance Use)  Goal: Optimized Energy Level (Excessive Substance Use)  Outcome: Met     Problem: Behavior Regulation Impairment (Excessive Substance Use)  Goal: Improved Behavioral Control (Excessive Substance Use)  Outcome: Met     Problem: Decreased Participation and Engagement (Excessive Substance Use)  Goal: Increased Participation and Engagement (Excessive Substance Use)  Outcome: Met     Problem: Physiologic Impairment (Excessive Substance Use)  Goal: Improved Physiologic Symptoms (Excessive Substance Use)  Outcome: Met     Problem: Social, Occupational or Functional Impairment (Excessive Substance Use)  Goal: Enhanced Social, Occupational or Functional Skills (Excessive Substance Use)  Outcome: Met     Problem:  Violence Risk or Actual  Goal: Anger and Impulse Control  Outcome: Met

## 2023-05-22 NOTE — NURSING
"Daily Nursing Note:      Behavior:    Patient (Efren Ren is a 63 y.o. male, : 1960, MRN: [id number]) demonstrating an affect that was congruent. Efren demonstrating mood that is depressed. Efren had an appearance that was disheveled. Efren denies suicidal ideation. Efren denies suicide plan. Efren denies homicidal ideation. Efren denies hallucinations.    Efren's  height is 5' 8" (1.727 m) and weight is 95.3 kg (210 lb). His temperature is 97.9 °F (36.6 °C). His blood pressure is 150/82 (abnormal) and his pulse is 90. His respiration is 18 and oxygen saturation is 100%.     Efren's last BM was noted on: 23    Intervention:    Encourage Efren to perform self-hygiene, grooming, and changing of clothing. Monitor Efren's behavior and program compliance. Monitor Efren for suicidal ideation, homicidal ideation, sleep disturbance, and hallucinations. Encourage Efren to eat all portions of meals and assess for meal preferences. Monitor Efren for intake and output to ensure hydration. Notify the Physician/Physician Assistant/Advance Practice Registered Nurse (MD/PA/APRN) for any medication refusal and any change in patient condition.      Response:    Efren verbalizes understand of unit process and procedures. Efren reported medications are helping.      Plan:     Continue to monitor per MD/PA/APRN orders; maintain patient safety.   "

## 2023-05-22 NOTE — CARE UPDATE
Sw informed by nurse that pt was now refusing to go to rehab. Pt informed sw that he would be dc to a cousin address. SW spoke to cousin; Sneha Ren [phone] who provided [address], [place] as the dc address.

## 2023-05-22 NOTE — GROUP NOTE
Group Psychotherapy       Group Focus: Life Skills   Release Activity: Patient will receive materials to complete an activity focusing on the importance of release. Patient will be able to identify things that they need to hold on to and things that they need to release. Patient will be able to engage in group discussion based on the topic provided. Patient will maintain a positive attitude and overall outlook on life.    Number of patients in attendance: 12    Group Start Time: 1000  Group End Time:  1045  Groups Date: 5/22/2023  Group Topic:  Behavioral Health  Group Department: [facility]  Group Facilitators:  Jennifer Rossi  _____________________________________________________________________    Patient Name: Efren Ren  MRN: [id number]  Patient Class: IP- Psych   Admission Date\Time: 5/17/2023  6:01 AM  Hospital Length of Stay: 5  Primary Care Provider: Primary Doctor No     Referred by:      Target symptoms: Mood Disorder     Patient's response to treatment: Active Listening and Self-disclosure     Progress toward goals: Progressing well     Interval History:      Diagnosis:      Plan: Pt will continue to attend daily group sessions and focus on improving overall health, thought process, behavior, and mood.

## 2023-05-22 NOTE — GROUP NOTE
Group Psychotherapy       Group Focus: psychiatric medication group      Number of patients in attendance: 17    Group Start Time: 2030  Group End Time:  2100  Groups Date: 5/21/2023  Group Topic:  Behavioral Health  Group Department: [facility]  Group Facilitators:  Ronaldo Avina RN  _____________________________________________________________________    Patient Name: Efren Ren  MRN: [id number]  Patient Class: IP- Psych   Admission Date\Time: 5/17/2023  6:01 AM  Hospital Length of Stay: 4  Primary Care Provider: Primary Doctor No     Referred by: Acute Psychiatry Unit Treatment Team     Target symptoms: Psychosis     Patient's response to treatment: Active Listening     Progress toward goals: Progressing adequately     Interval History:      Diagnosis: schizophenia     Plan: Continue treatment

## 2023-05-22 NOTE — PROGRESS NOTES
"5/22/2023  Efrne Ren   1960   [id number]        Psychiatry Progress Note     Chief Complaint: "I'm good"    SUBJECTIVE:   Efren Ren is a 63 y.o. male Efren Ren is a 62 y.o. male placed under a PEC at [facility] due to auditory and visual hallucinations after unknown drug use last night.    Patient due for discharge today.  He was due to go to [place] but is now stating that he wants to return home.  He gives no reason as to why that is the case.  Denies thoughts of self-harm or harm to others.  No tremors, rigidity, extrapyramidal symptoms, or excessive sedation were noted.  Will proceed with discharge.    UDS: (+)cocaine, cannabinoids  Blood alcohol: <10     Current Medications:   Scheduled Meds:    aluminum-magnesium hydroxide-simethicone  30 mL Oral Q4H While awake    ARIPiprazole  5 mg Oral Daily    famotidine  40 mg Oral Nightly    nicotine  1 patch Transdermal Daily    pantoprazole  40 mg Oral Daily    sertraline  50 mg Oral Daily      PRN Meds: acetaminophen, haloperidoL **AND** diphenhydrAMINE **AND** LORazepam **AND** haloperidol lactate **AND** diphenhydrAMINE **AND** lorazepam, hydrOXYzine HCL, trazodone   Psychotherapeutics (From admission, onward)      Start     Stop Route Frequency Ordered    05/18/23 1115  sertraline tablet 50 mg         -- Oral Daily 05/18/23 1006    05/17/23 1145  ARIPiprazole tablet 5 mg         -- Oral Daily 05/17/23 1032    05/17/23 0602  haloperidoL tablet 10 mg  (Med - Acute  Behavioral Management)        Question:  Is the patient competent?  Answer:  Yes   See Hyperspace for full Linked Orders Report.    -- Oral Every 6 hours PRN 05/17/23 0602    05/17/23 0602  LORazepam tablet 2 mg  (Med - Acute  Behavioral Management)        Question:  Is the patient competent?  Answer:  Yes   See Hyperspace for full Linked Orders Report.    -- Oral Every 6 hours PRN 05/17/23 0602    05/17/23 0602  haloperidol lactate injection 10 mg  (Med - Acute  Behavioral Management)      " "  See Hyperspace for full Linked Orders Report.    -- IM Every 6 hours PRN 05/17/23 0602    05/17/23 0602  LORazepam injection 2 mg  (Med - Acute  Behavioral Management)        Question:  Is the patient competent?  Answer:  Yes   See Hyperspace for full Linked Orders Report.    -- IM Every 6 hours PRN 05/17/23 0602    05/17/23 0602  traZODone tablet 100 mg        Question:  Is the patient competent?  Answer:  Yes    -- Oral Nightly PRN 05/17/23 0602            Allergies:   Review of patient's allergies indicates:  No Known Allergies     OBJECTIVE:   Vitals   Vitals:    05/21/23 0701   BP: (!) 150/82   Pulse: 90   Resp: 18   Temp: 97.9 °F (36.6 °C)        Labs/Imaging/Studies:   No results found for this or any previous visit (from the past 36 hour(s)).       Medical Review Of Systems:  Constitutional: negative  Respiratory: negative  Cardiovascular: negative  Gastrointestinal: negative  Genitourinary:negative  Musculoskeletal:negative  Neurological: negative      Psychiatric Mental Status Exam:  General Appearance: appears stated age, well-developed, well-nourished  Arousal: alert  Behavior: cooperative  Movements and Motor Activity: no abnormal involuntary movements noted  Orientation: oriented to person, place, time, and situation  Speech: normal rate, normal rhythm, normal volume, normal tone  Mood: "All right"  Affect: mood-congruent  Thought Process: linear  Associations: intact  Thought Content and Perceptions: no suicidal ideation, no homicidal ideation, no auditory hallucinations, no visual hallucinations, no paranoid ideation, no ideas of reference  Recent and Remote Memory: recent memory intact, remote memory intact; per interview/observation with patient  Attention and Concentration: intact, attentive to conversation; per interview/observation with patient  Fund of Knowledge: intact, aware of current events, vocabulary appropriate; based on history, vocabulary, fund of knowledge, syntax, grammar, and " content  Insight: questionable; based on understanding of severity of illness and HPI  Judgment: questionable; based on patient's behavior and HPI      ASSESSMENT/PLAN:   Problems Addressed/Diagnoses:  Unspecified Psychotic Disorder (F29)  Unspecified Mood disorder (F39)  Cocaine use disorder (F14.20)  Cannabis use disorder (F12.20)    Past Medical History:   Diagnosis Date    Hypertension         Plan:  Psychosis  -Continue Abilify    Mood  -Continue Zoloft    Cocaine use  -Group/Individual psychotherapy  -Recommend substance treatment program    Cannabis use  -Group/Individual psychotherapy  -Recommend substance treatment program      -Will discharge home        Sylvain Pascal M.D.

## 2023-05-22 NOTE — NURSING
"Daily Nursing Note:      Behavior:    Patient (Efren Ren is a 63 y.o. male, : 1960, MRN: [id number]) demonstrating an affect that was congruent. Efren demonstrating mood that is pleasant and appropriate. Efren had an appearance that was disheveled and poor hygiene. Efren denies suicidal ideation. Efren denies suicide plan. Efren denies homicidal ideation. Efren denies hallucinations.    Efren's  height is 5' 8" (1.727 m) and weight is 95.3 kg (210 lb). His temperature is 97.9 °F (36.6 °C). His blood pressure is 150/82 (abnormal) and his pulse is 90. His respiration is 18 and oxygen saturation is 100%.     Efren's last BM was noted on: __2023  _____      Intervention:    Encourage Efren to perform self-hygiene, grooming, and changing of clothing. Monitor Efren's behavior and program compliance. Monitor Efren for suicidal ideation, homicidal ideation, sleep disturbance, and hallucinations. Encourage Efren to eat all portions of meals and assess for meal preferences. Monitor Efren for intake and output to ensure hydration. Notify the Physician/Physician Assistant/Advance Practice Registered Nurse (MD/PA/APRN) for any medication refusal and any change in patient condition.      Response:    Efren verbalizes understand of unit process and procedures. Efren reported medications effectiveness with decreasing anxiety, depression and psychosis._____.      Plan:     Continue to monitor per MD/PA/APRN orders; maintain patient safety.    "

## 2023-07-01 ENCOUNTER — HOSPITAL ENCOUNTER (INPATIENT)
Facility: HOSPITAL | Age: 63
LOS: 5 days | Discharge: OTHER INSTITUTION | DRG: 885 | End: 2023-07-06
Attending: PSYCHIATRY & NEUROLOGY | Admitting: PSYCHIATRY & NEUROLOGY
Payer: MEDICAID

## 2023-07-01 ENCOUNTER — HOSPITAL ENCOUNTER (EMERGENCY)
Facility: HOSPITAL | Age: 63
Discharge: PSYCHIATRIC OR SUBSTANCE USE FACILITY | End: 2023-07-01
Attending: INTERNAL MEDICINE
Payer: MEDICAID

## 2023-07-01 VITALS
WEIGHT: 225 LBS | HEART RATE: 80 BPM | OXYGEN SATURATION: 100 % | SYSTOLIC BLOOD PRESSURE: 120 MMHG | TEMPERATURE: 98 F | BODY MASS INDEX: 32.21 KG/M2 | RESPIRATION RATE: 18 BRPM | HEIGHT: 70 IN | DIASTOLIC BLOOD PRESSURE: 80 MMHG

## 2023-07-01 DIAGNOSIS — Z86.59 HISTORY OF SCHIZOPHRENIA: ICD-10-CM

## 2023-07-01 DIAGNOSIS — F32.A DEPRESSION: ICD-10-CM

## 2023-07-01 DIAGNOSIS — N30.00 ACUTE CYSTITIS WITHOUT HEMATURIA: ICD-10-CM

## 2023-07-01 DIAGNOSIS — R45.851 DEPRESSION WITH SUICIDAL IDEATION: Primary | ICD-10-CM

## 2023-07-01 DIAGNOSIS — F32.A DEPRESSION WITH SUICIDAL IDEATION: Primary | ICD-10-CM

## 2023-07-01 DIAGNOSIS — Z00.8 MEDICAL CLEARANCE FOR PSYCHIATRIC ADMISSION: ICD-10-CM

## 2023-07-01 LAB
ALBUMIN SERPL-MCNC: 4.3 G/DL (ref 3.4–4.8)
ALBUMIN/GLOB SERPL: 1 RATIO (ref 1.1–2)
ALP SERPL-CCNC: 90 UNIT/L (ref 40–150)
ALT SERPL-CCNC: 49 UNIT/L (ref 0–55)
AMPHET UR QL SCN: NEGATIVE
APPEARANCE UR: ABNORMAL
AST SERPL-CCNC: 30 UNIT/L (ref 5–34)
BACTERIA #/AREA URNS AUTO: ABNORMAL /HPF
BARBITURATE SCN PRESENT UR: NEGATIVE
BASOPHILS # BLD AUTO: 0.06 X10(3)/MCL
BASOPHILS NFR BLD AUTO: 1 %
BENZODIAZ UR QL SCN: NEGATIVE
BILIRUB UR QL STRIP.AUTO: NEGATIVE MG/DL
BILIRUBIN DIRECT+TOT PNL SERPL-MCNC: 0.6 MG/DL
BUN SERPL-MCNC: 17.8 MG/DL (ref 8.4–25.7)
CALCIUM SERPL-MCNC: 10.7 MG/DL (ref 8.8–10)
CANNABINOIDS UR QL SCN: POSITIVE
CAOX CRY URNS QL MICRO: ABNORMAL /HPF
CHLORIDE SERPL-SCNC: 108 MMOL/L (ref 98–107)
CO2 SERPL-SCNC: 22 MMOL/L (ref 23–31)
COCAINE UR QL SCN: POSITIVE
COLOR UR: YELLOW
CREAT SERPL-MCNC: 0.98 MG/DL (ref 0.73–1.18)
EOSINOPHIL # BLD AUTO: 0.25 X10(3)/MCL (ref 0–0.9)
EOSINOPHIL NFR BLD AUTO: 4 %
ERYTHROCYTE [DISTWIDTH] IN BLOOD BY AUTOMATED COUNT: 14.6 % (ref 11.5–17)
ETHANOL SERPL-MCNC: <10 MG/DL
FENTANYL UR QL SCN: NEGATIVE
GFR SERPLBLD CREATININE-BSD FMLA CKD-EPI: >60 MLS/MIN/1.73/M2
GLOBULIN SER-MCNC: 4.2 GM/DL (ref 2.4–3.5)
GLUCOSE SERPL-MCNC: 98 MG/DL (ref 82–115)
GLUCOSE UR QL STRIP.AUTO: NORMAL MG/DL
HCT VFR BLD AUTO: 48.8 % (ref 42–52)
HGB BLD-MCNC: 15.8 G/DL (ref 14–18)
HYALINE CASTS #/AREA URNS LPF: ABNORMAL /LPF
IMM GRANULOCYTES # BLD AUTO: 0.02 X10(3)/MCL (ref 0–0.04)
IMM GRANULOCYTES NFR BLD AUTO: 0.3 %
KETONES UR QL STRIP.AUTO: NEGATIVE MG/DL
LEUKOCYTE ESTERASE UR QL STRIP.AUTO: 75 UNIT/L
LYMPHOCYTES # BLD AUTO: 2.31 X10(3)/MCL (ref 0.6–4.6)
LYMPHOCYTES NFR BLD AUTO: 36.6 %
MCH RBC QN AUTO: 28.6 PG (ref 27–31)
MCHC RBC AUTO-ENTMCNC: 32.4 G/DL (ref 33–36)
MCV RBC AUTO: 88.4 FL (ref 80–94)
MDMA UR QL SCN: NEGATIVE
MONOCYTES # BLD AUTO: 0.76 X10(3)/MCL (ref 0.1–1.3)
MONOCYTES NFR BLD AUTO: 12 %
MUCOUS THREADS URNS QL MICRO: ABNORMAL /LPF
NEUTROPHILS # BLD AUTO: 2.91 X10(3)/MCL (ref 2.1–9.2)
NEUTROPHILS NFR BLD AUTO: 46.1 %
NITRITE UR QL STRIP.AUTO: NEGATIVE
NRBC BLD AUTO-RTO: 0 %
OPIATES UR QL SCN: NEGATIVE
PCP UR QL: NEGATIVE
PH UR STRIP.AUTO: 5.5 [PH]
PH UR: 5.5 [PH] (ref 3–11)
PLATELET # BLD AUTO: 281 X10(3)/MCL (ref 130–400)
PMV BLD AUTO: 9.9 FL (ref 7.4–10.4)
POTASSIUM SERPL-SCNC: 4 MMOL/L (ref 3.5–5.1)
PROT SERPL-MCNC: 8.5 GM/DL (ref 5.8–7.6)
PROT UR QL STRIP.AUTO: ABNORMAL MG/DL
RBC # BLD AUTO: 5.52 X10(6)/MCL (ref 4.7–6.1)
RBC #/AREA URNS AUTO: ABNORMAL /HPF
RBC UR QL AUTO: NEGATIVE UNIT/L
SARS-COV-2 RDRP RESP QL NAA+PROBE: NEGATIVE
SODIUM SERPL-SCNC: 141 MMOL/L (ref 136–145)
SP GR UR STRIP.AUTO: 1.03
SQUAMOUS #/AREA URNS LPF: ABNORMAL /HPF
TSH SERPL-ACNC: 0.77 UIU/ML (ref 0.35–4.94)
UROBILINOGEN UR STRIP-ACNC: ABNORMAL MG/DL
WBC # SPEC AUTO: 6.31 X10(3)/MCL (ref 4.5–11.5)
WBC #/AREA URNS AUTO: ABNORMAL /HPF

## 2023-07-01 PROCEDURE — 25000003 PHARM REV CODE 250: Performed by: INTERNAL MEDICINE

## 2023-07-01 PROCEDURE — 87635 SARS-COV-2 COVID-19 AMP PRB: CPT | Performed by: INTERNAL MEDICINE

## 2023-07-01 PROCEDURE — 81001 URINALYSIS AUTO W/SCOPE: CPT | Performed by: INTERNAL MEDICINE

## 2023-07-01 PROCEDURE — S4991 NICOTINE PATCH NONLEGEND: HCPCS | Performed by: INTERNAL MEDICINE

## 2023-07-01 PROCEDURE — 93005 ELECTROCARDIOGRAM TRACING: CPT

## 2023-07-01 PROCEDURE — 80053 COMPREHEN METABOLIC PANEL: CPT | Performed by: INTERNAL MEDICINE

## 2023-07-01 PROCEDURE — 82077 ASSAY SPEC XCP UR&BREATH IA: CPT | Performed by: INTERNAL MEDICINE

## 2023-07-01 PROCEDURE — 12400001 HC PSYCH SEMI-PRIVATE ROOM

## 2023-07-01 PROCEDURE — 99285 EMERGENCY DEPT VISIT HI MDM: CPT

## 2023-07-01 PROCEDURE — 85025 COMPLETE CBC W/AUTO DIFF WBC: CPT | Performed by: INTERNAL MEDICINE

## 2023-07-01 PROCEDURE — 25000003 PHARM REV CODE 250: Performed by: NURSE PRACTITIONER

## 2023-07-01 PROCEDURE — 84443 ASSAY THYROID STIM HORMONE: CPT | Performed by: INTERNAL MEDICINE

## 2023-07-01 PROCEDURE — 80307 DRUG TEST PRSMV CHEM ANLYZR: CPT | Performed by: INTERNAL MEDICINE

## 2023-07-01 RX ORDER — ONDANSETRON 4 MG/1
4 TABLET, ORALLY DISINTEGRATING ORAL EVERY 6 HOURS PRN
Status: DISCONTINUED | OUTPATIENT
Start: 2023-07-01 | End: 2023-07-06 | Stop reason: HOSPADM

## 2023-07-01 RX ORDER — LORAZEPAM 2 MG/ML
2 INJECTION INTRAMUSCULAR EVERY 6 HOURS PRN
Status: DISCONTINUED | OUTPATIENT
Start: 2023-07-01 | End: 2023-07-06 | Stop reason: HOSPADM

## 2023-07-01 RX ORDER — DIPHENHYDRAMINE HCL 50 MG
50 CAPSULE ORAL EVERY 6 HOURS PRN
Status: DISCONTINUED | OUTPATIENT
Start: 2023-07-01 | End: 2023-07-06 | Stop reason: HOSPADM

## 2023-07-01 RX ORDER — MAG HYDROX/ALUMINUM HYD/SIMETH 200-200-20
30 SUSPENSION, ORAL (FINAL DOSE FORM) ORAL EVERY 6 HOURS PRN
Status: DISCONTINUED | OUTPATIENT
Start: 2023-07-01 | End: 2023-07-06 | Stop reason: HOSPADM

## 2023-07-01 RX ORDER — DIPHENHYDRAMINE HYDROCHLORIDE 50 MG/ML
50 INJECTION INTRAMUSCULAR; INTRAVENOUS EVERY 6 HOURS PRN
Status: DISCONTINUED | OUTPATIENT
Start: 2023-07-01 | End: 2023-07-06 | Stop reason: HOSPADM

## 2023-07-01 RX ORDER — ACETAMINOPHEN 325 MG/1
650 TABLET ORAL EVERY 6 HOURS PRN
Status: DISCONTINUED | OUTPATIENT
Start: 2023-07-01 | End: 2023-07-06 | Stop reason: HOSPADM

## 2023-07-01 RX ORDER — HALOPERIDOL 5 MG/1
10 TABLET ORAL EVERY 6 HOURS PRN
Status: DISCONTINUED | OUTPATIENT
Start: 2023-07-01 | End: 2023-07-06 | Stop reason: HOSPADM

## 2023-07-01 RX ORDER — IBUPROFEN 200 MG
1 TABLET ORAL DAILY
Status: DISCONTINUED | OUTPATIENT
Start: 2023-07-01 | End: 2023-07-06 | Stop reason: HOSPADM

## 2023-07-01 RX ORDER — DOXYCYCLINE HYCLATE 100 MG
100 TABLET ORAL EVERY 12 HOURS
Status: DISCONTINUED | OUTPATIENT
Start: 2023-07-01 | End: 2023-07-01 | Stop reason: HOSPADM

## 2023-07-01 RX ORDER — HYDROXYZINE HYDROCHLORIDE 50 MG/1
50 TABLET, FILM COATED ORAL EVERY 4 HOURS PRN
Status: DISCONTINUED | OUTPATIENT
Start: 2023-07-01 | End: 2023-07-06 | Stop reason: HOSPADM

## 2023-07-01 RX ORDER — TRAZODONE HYDROCHLORIDE 100 MG/1
100 TABLET ORAL NIGHTLY PRN
Status: DISCONTINUED | OUTPATIENT
Start: 2023-07-01 | End: 2023-07-06 | Stop reason: HOSPADM

## 2023-07-01 RX ORDER — HALOPERIDOL 5 MG/ML
10 INJECTION INTRAMUSCULAR EVERY 6 HOURS PRN
Status: DISCONTINUED | OUTPATIENT
Start: 2023-07-01 | End: 2023-07-06 | Stop reason: HOSPADM

## 2023-07-01 RX ORDER — IBUPROFEN 200 MG
1 TABLET ORAL DAILY
Status: DISCONTINUED | OUTPATIENT
Start: 2023-07-01 | End: 2023-07-01 | Stop reason: HOSPADM

## 2023-07-01 RX ORDER — LORAZEPAM 1 MG/1
2 TABLET ORAL EVERY 6 HOURS PRN
Status: DISCONTINUED | OUTPATIENT
Start: 2023-07-01 | End: 2023-07-06 | Stop reason: HOSPADM

## 2023-07-01 RX ORDER — SERTRALINE HYDROCHLORIDE 50 MG/1
50 TABLET, FILM COATED ORAL DAILY
Status: DISCONTINUED | OUTPATIENT
Start: 2023-07-01 | End: 2023-07-05

## 2023-07-01 RX ORDER — PROMETHAZINE HYDROCHLORIDE 25 MG/ML
25 INJECTION, SOLUTION INTRAMUSCULAR; INTRAVENOUS EVERY 6 HOURS PRN
Status: DISCONTINUED | OUTPATIENT
Start: 2023-07-01 | End: 2023-07-06 | Stop reason: HOSPADM

## 2023-07-01 RX ORDER — ADHESIVE BANDAGE
30 BANDAGE TOPICAL DAILY PRN
Status: DISCONTINUED | OUTPATIENT
Start: 2023-07-01 | End: 2023-07-06 | Stop reason: HOSPADM

## 2023-07-01 RX ADMIN — NICOTINE 1 PATCH: 14 PATCH, EXTENDED RELEASE TRANSDERMAL at 11:07

## 2023-07-01 RX ADMIN — SERTRALINE HYDROCHLORIDE 50 MG: 50 TABLET ORAL at 05:07

## 2023-07-01 RX ADMIN — DOXYCYCLINE HYCLATE 100 MG: 100 TABLET, COATED ORAL at 11:07

## 2023-07-01 RX ADMIN — ALUMINUM HYDROXIDE, MAGNESIUM HYDROXIDE, AND SIMETHICONE 30 ML: 200; 200; 20 SUSPENSION ORAL at 08:07

## 2023-07-01 NOTE — NURSING
"Admission Note:    Efren Ren is a 63 y.o. male, : 1960, MRN: [id number], admitted on 2023 to [facility] ([facility]) for Sylvain Pascal MD with a diagnosis of Depression [F32.A]. Patient admitted on a status of Physician Emergency Certificate (PEC). Efren reports no known food or drug allergies.    Patient demonstrated an affect that was sad, flat, and anxious. Patient demonstrated mood during assessment that was depressed and anxious. Patient had an appearance that was disheveled.  Patient endorses suicidal ideation. Patient denies suicide plan. Patient denies hallucinations.    Efren's  height is 5' 10" (1.778 m) and weight is 93.9 kg (207 lb 0.2 oz). His temperature is 98.4 °F (36.9 °C). His blood pressure is 150/83 (abnormal) and his pulse is 94. His respiration is 20.     Efren's last BM was noted on: 23.    Metal detector screening performed via security personnel. The result of the scan was no contraband found. Head-to-toe physical assessment completed with the following findings: no wounds found upon body screen. A full skin assessment was performed. Efren's skin appeared warm, dry, and intact. Efren was oriented to unit, staff, peers, and room. Patient belongings/valuables stored in locked intake room cabinet and changes of clothing provided to patient. Efren was placed on Q 15 min observations.       "

## 2023-07-01 NOTE — ED PROVIDER NOTES
Encounter Date: 7/1/2023       History     Chief Complaint   Patient presents with    Suicidal     Relapsed on crack and etoh last night. Now hearing voices that are telling him to kill himself. Denies HI.      Presents with suicidal ideations after relapsing in crack cocaine abuse. States not taking his medications for schizophrenia.     The history is provided by the patient.   Review of patient's allergies indicates:  No Known Allergies  Past Medical History:   Diagnosis Date    Hypertension      Past Surgical History:   Procedure Laterality Date    HIP SURGERY Left     LEG SURGERY Left      No family history on file.  Social History     Tobacco Use    Smoking status: Every Day     Types: Cigarettes    Smokeless tobacco: Never     Review of Systems   Unable to perform ROS: Psychiatric disorder     Physical Exam     Initial Vitals [07/01/23 1023]   BP Pulse Resp Temp SpO2   122/88 90 20 97.7 °F (36.5 °C) 96 %      MAP       --         Physical Exam    Nursing note and vitals reviewed.  Constitutional: He appears well-developed and well-nourished.   HENT:   Head: Normocephalic and atraumatic.   Eyes: Conjunctivae and EOM are normal. Pupils are equal, round, and reactive to light.   Neck: Neck supple. No thyromegaly present. No tracheal deviation present. No JVD present.   Normal range of motion.  Cardiovascular:  Normal rate, regular rhythm, normal heart sounds and intact distal pulses.           Pulmonary/Chest: Breath sounds normal. No respiratory distress.   Abdominal: Abdomen is soft. Bowel sounds are normal. He exhibits no distension. There is no abdominal tenderness. There is no rebound and no guarding.   Musculoskeletal:         General: No edema. Normal range of motion.      Cervical back: Normal range of motion and neck supple.     Neurological: He is alert and oriented to person, place, and time. He has normal strength. GCS score is 15. GCS eye subscore is 4. GCS verbal subscore is 5. GCS motor subscore  is 6.   Skin: Skin is warm and dry. No rash noted.   Psychiatric: His speech is delayed. He is slowed. He is not actively hallucinating. Cognition and memory are impaired. He expresses inappropriate judgment. He exhibits a depressed mood. He expresses suicidal ideation. He is attentive.       ED Course   Procedures  Labs Reviewed   COMPREHENSIVE METABOLIC PANEL - Abnormal; Notable for the following components:       Result Value    Chloride 108 (*)     Carbon Dioxide 22 (*)     Calcium Level Total 10.7 (*)     Protein Total 8.5 (*)     Globulin 4.2 (*)     Albumin/Globulin Ratio 1.0 (*)     All other components within normal limits   URINALYSIS, REFLEX TO URINE CULTURE - Abnormal; Notable for the following components:    Appearance, UA Turbid (*)     Protein, UA 1+ (*)     Urobilinogen, UA 1+ (*)     Leukocyte Esterase, UA 75 (*)     WBC, UA 6-10 (*)     Bacteria, UA Trace (*)     Squamous Epithelial Cells, UA Trace (*)     Mucous, UA Many (*)     Calcium Oxalate Crystals, UA Occ (*)     All other components within normal limits   CBC WITH DIFFERENTIAL - Abnormal; Notable for the following components:    MCHC 32.4 (*)     All other components within normal limits   ALCOHOL,MEDICAL (ETHANOL) - Normal   SARS-COV-2 RNA AMPLIFICATION, QUAL - Normal    Narrative:     The IDNOW COVID-19 assay is a rapid molecular in vitro diagnostic test utilizing an isothermal nucleic acid amplification technology intended for the qualitative detection of nucleic acid from the SARS-CoV-2 viral RNA in direct nasal, nasopharyngeal or throat swabs from individuals who are suspected of COVID-19 by their healthcare provider.   CBC W/ AUTO DIFFERENTIAL    Narrative:     The following orders were created for panel order CBC auto differential.  Procedure                               Abnormality         Status                     ---------                               -----------         ------                     CBC with  Differential[[id number]]        Abnormal            Final result                 Please view results for these tests on the individual orders.   TSH   DRUG SCREEN, URINE (BEAKER)     EKG Readings: (Independently Interpreted)   Initial Reading: No STEMI. Rhythm: Normal Sinus Rhythm. Heart Rate: 80. Ectopy: No Ectopy. Conduction: Normal. ST Segments: Normal ST Segments. T Waves: Normal. Axis: Normal. Clinical Impression: Normal Sinus Rhythm   ECG Results              EKG 12-lead (In process)  Result time 07/01/23 11:01:00      In process by Interface, Lab In [facility] (07/01/23 11:01:00)                   Narrative:    Test Reason : Z00.8,    Vent. Rate : 080 BPM     Atrial Rate : 080 BPM     P-R Int : 148 ms          QRS Dur : 098 ms      QT Int : 424 ms       P-R-T Axes : 055 052 059 degrees     QTc Int : 489 ms    Normal sinus rhythm with sinus arrhythmia  Right ventricular conduction delay  Septal infarct (cited on or before 02-APR-2023)  Abnormal ECG  When compared with ECG of 02-APR-2023 09:46,  No significant change was found    Referred By:             Confirmed By:                                   Imaging Results    None          Medications   nicotine 14 mg/24 hr 1 patch (has no administration in time range)   doxycycline tablet 100 mg (has no administration in time range)     Medical Decision Making:   Differential Diagnosis:   Schizophrenia exacerbation, bipolar psychosis, drug induced psychosis, thyrotoxicosis, renal failure, liver failure, toxydrome, among others    Independently Interpreted Test(s):   I have ordered and independently interpreted EKG Reading(s) - see prior notes  Clinical Tests:   Lab Tests: Ordered  Medical Tests: Ordered              Medically cleared for psychiatry placement: 7/1/2023 11:26 AM         Clinical Impression:   Final diagnoses:  [Z00.8] Medical clearance for psychiatric admission  [F32.A, R45.851] Depression with suicidal ideation (Primary)  [Z86.59] History of  schizophrenia  [N30.00] Acute cystitis without hematuria        ED Disposition Condition    Transfer to Psych Facility Stable          ED Prescriptions    None       Follow-up Information    None          Sekou Contreras MD  07/01/23 1126

## 2023-07-01 NOTE — H&P
"7/1/2023 4:32 PM   Efren Ren   1960   [id number]       Initial Psychiatric Consult    Chief complaint: "I messed up bad yesterday."     SUBJECTIVE:   HPI:   Efren Ren is a 63 y.o. male with past psychiatric history of major depressive disorder and polysubstance abuse, currently admitted with depression with SI after relapse.  Psychiatry has been consulted to address current symptoms.    Mr. Schwartz reports that he was discharged from a rehab facility in [place] on 5/5/23.  He says that he had remained alcohol and drug free up until yesterday.  He says that he needs to get this problem fixed.  He says that he stayed at a hotel last night and drank a fifth of whiskey and did crack all night.  He says that he does not know why he did it.  He says that he is usually homeless.  He says that he became homeless a while ago after he and his neighbor kept getting into it and he was evicted.  He says that he has been homeless since then.  He says that he sometimes sleeps on his cousin's porch or another cousin may let him spend the night.  He says that he feels like no one loves him.  He endorses SI with no plan.  He endorses feelings of hopelessness.  He reports anxiety related to his current living situation and things just not going right in life.  He says that he recently lost his check card.  He endorses AH of rambling voices.  He says that the voices started early this morning.  He denies VH or delusions.  He says that he feels very tired but had not been able to sleep recently.        Collateral:   Reviewed records and labs from hospital as well as pharmacy list.    Past Psychiatric History:   Previous Psychiatric Hospitalizations: He reports a few but does not recall the names.   Previous Medication Trials: Wellbutrin XL, Zoloft, and Abilify - per records.  He says that the last meds he was on worked until he relapsed on yesterday.  Previous Suicide Attempts: Denies   History of Violence: Denies "   Outpatient psychiatrist: Denies     Past Medical/Surgical History:   Past Medical History:   Diagnosis Date    Hypertension      Past Surgical History:   Procedure Laterality Date    HIP SURGERY Left     LEG SURGERY Left           Family Psychiatric History:   Denies       Current Medications:   Scheduled Meds:    nicotine  1 patch Transdermal Daily      PRN Meds: acetaminophen, aluminum-magnesium hydroxide-simethicone, haloperidoL **AND** diphenhydrAMINE **AND** LORazepam **AND** haloperidol lactate **AND** diphenhydrAMINE **AND** lorazepam, hydrOXYzine HCL, magnesium hydroxide 400 mg/5 ml, ondansetron, promethazine, traZODone   Psychotherapeutics (From admission, onward)      Start     Stop Route Frequency Ordered    07/01/23 1331  traZODone tablet 100 mg         -- Oral Nightly PRN 07/01/23 1253    07/01/23 1328  haloperidoL tablet 10 mg  (Med - Acute  Behavioral Management)        See Hyperspace for full Linked Orders Report.    -- Oral Every 6 hours PRN 07/01/23 1253    07/01/23 1327  LORazepam tablet 2 mg  (Med - Acute  Behavioral Management)        See Hyperspace for full Linked Orders Report.    -- Oral Every 6 hours PRN 07/01/23 1253    07/01/23 1327  haloperidol lactate injection 10 mg  (Med - Acute  Behavioral Management)        See Hyperspace for full Linked Orders Report.    -- IM Every 6 hours PRN 07/01/23 1253    07/01/23 1327  LORazepam injection 2 mg  (Med - Acute  Behavioral Management)        See Hyperspace for full Linked Orders Report.    -- IM Every 6 hours PRN 07/01/23 1253          OTC Meds: Denies   Home Meds: Says he had something for depression but he ran out and he does not know the name of it.     Allergies:   Review of patient's allergies indicates:  No Known Allergies       Substance Abuse History:   Tobacco Use:Smokes about 3-4 cigarettes a day   Use of Alcohol: Drank a fifth of vodka last night.  Was in rehab and got out 5/5/23   Recreational Drugs:Crack cocaine; relapsed last  "night   Rehab History:Has gone at least two times with most recent being in [place] and he was discharged 5/5/23.       Nerurological History:   Seizures: States that he had a seizure "a long long time ago."   Head trauma: Denies     Social History:  Housing Status: Homeless  Relationship Status/Sexual Orientation: single   Children: 5 biological and 4 step kids  Education: Left school in 6 th grade   Employment Status/Info: Disabled    history: Denies  History of physical/sexual abuse: Denies   Access to gun: Denies       Legal History:   Past Charges/Incarcerations:Has been arrested over 30 years ago.   Pending charges: Denies       OBJECTIVE:   Vitals   Vitals:    07/01/23 1248   BP: (!) 150/83   Pulse: 94   Resp: 20   Temp: 98.4 °F (36.9 °C)        Labs/Imaging/Studies:   Recent Results (from the past 48 hour(s))   Urinalysis, Reflex to Urine Culture    Collection Time: 07/01/23 10:35 AM    Specimen: Urine   Result Value Ref Range    Color, UA Yellow Yellow, Light-Yellow, Dark Yellow, Love, Straw    Appearance, UA Turbid (A) Clear    Specific Gravity, UA 1.032     pH, UA 5.5 5.0 - 8.5    Protein, UA 1+ (A) Negative mg/dL    Glucose, UA Normal Negative, Normal mg/dL    Ketones, UA Negative Negative mg/dL    Blood, UA Negative Negative unit/L    Bilirubin, UA Negative Negative mg/dL    Urobilinogen, UA 1+ (A) 0.2, 1.0, Normal mg/dL    Nitrites, UA Negative Negative    Leukocyte Esterase, UA 75 (A) Negative unit/L    WBC, UA 6-10 (A) None Seen, 0-2, 3-5, 0-5 /HPF    Bacteria, UA Trace (A) None Seen /HPF    Squamous Epithelial Cells, UA Trace (A) None Seen /HPF    Mucous, UA Many (A) None Seen /LPF    Hyaline Casts, UA None Seen None Seen /lpf    Calcium Oxalate Crystals, UA Occ (A) None Seen /HPF    RBC, UA 0-5 None Seen, 0-2, 3-5, 0-5 /HPF   Drug Screen, Urine    Collection Time: 07/01/23 10:35 AM   Result Value Ref Range    Amphetamines, Urine Negative Negative    Barbituates, Urine Negative " Negative    Benzodiazepine, Urine Negative Negative    Cannabinoids, Urine Positive (A) Negative    Cocaine, Urine Positive (A) Negative    Fentanyl, Urine Negative Negative    MDMA, Urine Negative Negative    Opiates, Urine Negative Negative    Phencyclidine, Urine Negative Negative    pH, Urine 5.5 3.0 - 11.0   COVID-19 Rapid Screening    Collection Time: 07/01/23 10:35 AM   Result Value Ref Range    SARS COV-2 MOLECULAR Negative Negative   Comprehensive metabolic panel    Collection Time: 07/01/23 10:50 AM   Result Value Ref Range    Sodium Level 141 136 - 145 mmol/L    Potassium Level 4.0 3.5 - 5.1 mmol/L    Chloride 108 (H) 98 - 107 mmol/L    Carbon Dioxide 22 (L) 23 - 31 mmol/L    Glucose Level 98 82 - 115 mg/dL    Blood Urea Nitrogen 17.8 8.4 - 25.7 mg/dL    Creatinine 0.98 0.73 - 1.18 mg/dL    Calcium Level Total 10.7 (H) 8.8 - 10.0 mg/dL    Protein Total 8.5 (H) 5.8 - 7.6 gm/dL    Albumin Level 4.3 3.4 - 4.8 g/dL    Globulin 4.2 (H) 2.4 - 3.5 gm/dL    Albumin/Globulin Ratio 1.0 (L) 1.1 - 2.0 ratio    Bilirubin Total 0.6 <=1.5 mg/dL    Alkaline Phosphatase 90 40 - 150 unit/L    Alanine Aminotransferase 49 0 - 55 unit/L    Aspartate Aminotransferase 30 5 - 34 unit/L    eGFR >60 mls/min/1.73/m2   TSH    Collection Time: 07/01/23 10:50 AM   Result Value Ref Range    Thyroid Stimulating Hormone 0.773 0.350 - 4.940 uIU/mL   Ethanol    Collection Time: 07/01/23 10:50 AM   Result Value Ref Range    Ethanol Level <10.0 <=10.0 mg/dL   CBC with Differential    Collection Time: 07/01/23 10:50 AM   Result Value Ref Range    WBC 6.31 4.50 - 11.50 x10(3)/mcL    RBC 5.52 4.70 - 6.10 x10(6)/mcL    Hgb 15.8 14.0 - 18.0 g/dL    Hct 48.8 42.0 - 52.0 %    MCV 88.4 80.0 - 94.0 fL    MCH 28.6 27.0 - 31.0 pg    MCHC 32.4 (L) 33.0 - 36.0 g/dL    RDW 14.6 11.5 - 17.0 %    Platelet 281 130 - 400 x10(3)/mcL    MPV 9.9 7.4 - 10.4 fL    Neut % 46.1 %    Lymph % 36.6 %    Mono % 12.0 %    Eos % 4.0 %    Basophil % 1.0 %    Lymph # 2.31  0.6 - 4.6 x10(3)/mcL    Neut # 2.91 2.1 - 9.2 x10(3)/mcL    Mono # 0.76 0.1 - 1.3 x10(3)/mcL    Eos # 0.25 0 - 0.9 x10(3)/mcL    Baso # 0.06 <=0.2 x10(3)/mcL    IG# 0.02 0 - 0.04 x10(3)/mcL    IG% 0.3 %    NRBC% 0.0 %      No results found for: PHENYTOIN, PHENOBARB, VALPROATE, CBMZ      Medical Review Of Systems:  A comprehensive review of systems was negative except for: Gastrointestinal: positive for diarrhea  Musculoskeletal: positive for back pain  Behavioral/Psych: positive for anxiety, depression, illegal drug usage, sleep disturbance, and tobacco use    Psychiatric Mental Status Exam:  General Appearance: disheveled  Arousal: alert  Behavior: restless and fidgety  Movements and Motor Activity: no abnormal involuntary movements noted  Orientation: oriented to person, place, time, and situation  Speech: normal rate, rhythm, volume, tone and pitch  Mood: Depressed, Anxious, and Restless  Affect: mood-congruent, dysphoric  Thought Process: organized, logical  Associations: intact, no loosening of associations  Thought Content and Perceptions: + suicidal ideation, no homicidal ideation, + auditory hallucinations, no visual hallucinations, no paranoid ideation, no delusions  Recent and Remote Memory: intact  Attention and Concentration: intact, Can perform simple calculations; he says that he cannot spell or read.  He reports a 6th grade education.  Fund of Knowledge: unable to identify the   Insight: adequate  Judgment: poor    ASSESSMENT/PLAN:   Major depressive disorder, severe with psychosis  Anxiety, NOS  Cocaine use disorder  Alcohol use disorder    Past Medical History:   Diagnosis Date    Hypertension     GERD  Chronic Low Back pain      Recommendations:    Zoloft 50 mg PO daily  CIWA initiated  Discussed medication R/B/SE with pt.  Talked about goals related to sobriety.  His plans are to go to long term rehab.  Estimated LOS:  3-5 days  Psych hospitalization necessary related  to SI and psychosis.      Olga Romero

## 2023-07-02 PROCEDURE — 25000003 PHARM REV CODE 250: Performed by: NURSE PRACTITIONER

## 2023-07-02 PROCEDURE — 12400001 HC PSYCH SEMI-PRIVATE ROOM

## 2023-07-02 RX ADMIN — ALUMINUM HYDROXIDE, MAGNESIUM HYDROXIDE, AND SIMETHICONE 30 ML: 200; 200; 20 SUSPENSION ORAL at 03:07

## 2023-07-02 RX ADMIN — HALOPERIDOL 10 MG: 5 TABLET ORAL at 08:07

## 2023-07-02 RX ADMIN — SERTRALINE HYDROCHLORIDE 50 MG: 50 TABLET ORAL at 08:07

## 2023-07-02 RX ADMIN — LORAZEPAM 2 MG: 1 TABLET ORAL at 08:07

## 2023-07-02 RX ADMIN — DIPHENHYDRAMINE HYDROCHLORIDE 50 MG: 50 CAPSULE ORAL at 08:07

## 2023-07-02 NOTE — NURSING
"PRN Medication Follow-up Note:    Behavior:    Patient (Efren Ren is a 63 y.o. male, : 1960, MRN: [id number])     Allergies: Patient has no known allergies.    Efren's  height is 5' 10" (1.778 m) and weight is 93.9 kg (207 lb 0.2 oz). His temperature is 98.1 °F (36.7 °C). His blood pressure is 124/89 and his pulse is 90. His respiration is 20 and oxygen saturation is 100%.     Administered Maalox 30ml PO PRN indigestion per physician order to Efren.      Intervention:    Intervention to Efren's response: effective.       Response:    Efren's response: no further reports of indigestion.      Plan:     Continue to monitor per MD/PA/APRN orders; and reevaluate medication effectiveness.  "

## 2023-07-02 NOTE — NURSING
"PRN Administration Note:    Behavior:    Patient (Efren Ren is a 63 y.o. male, : 1960, MRN: [id number])     Allergies: Patient has no known allergies.    Efren's  height is 5' 10" (1.778 m) and weight is 93.9 kg (207 lb 0.2 oz). His temperature is 98.1 °F (36.7 °C). His blood pressure is 157/89 (abnormal) and his pulse is 83. His respiration is 18 and oxygen saturation is 97%.     Reason for PRN Administration: __ _Complaints of indigestion._______.    Intervention:    Administered __ Antacid 30 ml.,_____ per physician order to Efren       Response:    Efren tolerated administration well.      Plan:     Continue to monitor per MD/PA/APRN orders; and reevaluate medication effectiveness within 30 minutes.    "

## 2023-07-02 NOTE — NURSING
"PRN Medication Follow-up Note:    Behavior:    Patient (Efren Ren is a 63 y.o. male, : 1960, MRN: [id number])     Allergies: Patient has no known allergies.    Efren's  height is 5' 10" (1.778 m) and weight is 93.9 kg (207 lb 0.2 oz). His temperature is 98.1 °F (36.7 °C). His blood pressure is 157/89 (abnormal) and his pulse is 83. His respiration is 18 and oxygen saturation is 97%.     Administered Antacid 30 ml., _______ per physician order to Efren       Intervention:    Intervention to Efren's response: Administer medication as ordered.________.       Response:    Efren's response: ___ No further complaints of indigestion.______      Plan:     Continue to monitor per MD/PA/APRN orders; and reevaluate medication effectiveness within 30 minutes.    "

## 2023-07-02 NOTE — GROUP NOTE
Group Psychotherapy       Group Focus: Psychiatric Medication Education      Number of patients in attendance: 13    Group Start Time: 2030  Group End Time:  2100  Groups Date: 7/1/2023  Group Topic:  Behavioral Health  Group Department: [facility]  Group Facilitators:  Kavitha Dinae RN  _____________________________________________________________________    Patient Name: Efren Ren  MRN: [id number]  Patient Class: IP- Psych   Admission Date\Time: 7/1/2023 12:48 PM  Hospital Length of Stay: 1  Primary Care Provider: Primary Doctor No     Referred by: Acute Psychiatry Unit Treatment Team     Target symptoms: Depression and Anxiety     Patient's response to treatment: Not Participating     Progress toward goals: Progressing slowly     Interval History:      Diagnosis: Depression     Plan: Continue treatment on AP[facility]

## 2023-07-02 NOTE — GROUP NOTE
Group Psychotherapy       Group Focus: Medication Education.      Number of patients in attendance: 15    Group Start Time: 0800  Group End Time:  0900  Groups Date: 7/2/2023  Group Topic:  Behavioral Health  Group Department: [facility]  Group Facilitators:  Ja Mcafrland RN  _____________________________________________________________________    Patient Name: Efren Ren  MRN: [id number]  Patient Class: IP- Psych   Admission Date\Time: 7/1/2023 12:48 PM  Hospital Length of Stay: 1  Primary Care Provider: Primary Doctor No     Referred by: Acute Psychiatry Unit Treatment Team     Target symptoms: Depression and Anxiety     Patient's response to treatment: Active Listening     Progress toward goals: Progressing well     Interval History:      Diagnosis: Depression.     Plan: Continue treatment on APU

## 2023-07-02 NOTE — H&P
[facility] - Behavioral Health Unit  History & Physical    Subjective:      Chief Complaint/Reason for Admission: cocaine and alcohol abuse (recent relapse).    Efren Ren is a 63 y.o. male.   Alcohol and cocaine abuse   Past Medical History:   Diagnosis Date    Hypertension      Past Surgical History:   Procedure Laterality Date    HIP SURGERY Left     LEG SURGERY Left      No family history on file.  Social History     Tobacco Use    Smoking status: Every Day     Types: Cigarettes    Smokeless tobacco: Never       PTA Medications   Medication Sig    ARIPiprazole (ABILIFY) 5 MG Tab Take 1 tablet (5 mg total) by mouth once daily.    famotidine (PEPCID) 40 MG tablet Take 1 tablet (40 mg total) by mouth nightly.    pantoprazole (PROTONIX) 40 MG tablet Take 1 tablet (40 mg total) by mouth once daily.    sertraline (ZOLOFT) 50 MG tablet Take 1 tablet (50 mg total) by mouth once daily.     Review of patient's allergies indicates:  No Known Allergies     Review of Systems   Constitutional: Negative.    HENT: Negative.     Eyes: Negative.    Respiratory: Negative.     Cardiovascular: Negative.    Gastrointestinal: Negative.    Genitourinary: Negative.    Musculoskeletal: Negative.    Skin: Negative.    Neurological: Negative.    Endo/Heme/Allergies: Negative.    Psychiatric/Behavioral:  Positive for depression, hallucinations and substance abuse. Negative for suicidal ideas.      Objective:      Vital Signs (Most Recent)  Temp: 98.1 °F (36.7 °C) (07/01/23 1700)  Pulse: 90 (07/01/23 1700)  Resp: 20 (07/01/23 1700)  BP: 124/89 (07/01/23 1700)  SpO2: 100 % (07/01/23 1700)    Vital Signs Range (Last 24H):  Temp:  [97.7 °F (36.5 °C)-98.4 °F (36.9 °C)]   Pulse:  [80-94]   Resp:  [16-20]   BP: (120-150)/(80-89)   SpO2:  [96 %-100 %]     Physical Exam  HENT:      Head: Normocephalic.      Right Ear: Tympanic membrane normal.      Left Ear: Tympanic membrane normal.      Nose: Nose normal.      Mouth/Throat:       Mouth: Mucous membranes are moist.   Eyes:      Extraocular Movements: Extraocular movements intact.      Pupils: Pupils are equal, round, and reactive to light.   Cardiovascular:      Rate and Rhythm: Normal rate and regular rhythm.   Pulmonary:      Effort: Pulmonary effort is normal.   Abdominal:      General: Abdomen is flat.   Musculoskeletal:         General: Normal range of motion.   Skin:     General: Skin is warm.   Neurological:      General: No focal deficit present.      Mental Status: He is alert and oriented to person, place, and time.      Comments: Vision normal   Hearing normal   EOM intact   Face muscles normal  Facial sensation normal   Shrugs shoulders  Tongue midline          Data Review:    Recent Results (from the past 48 hour(s))   Urinalysis, Reflex to Urine Culture    Collection Time: 07/01/23 10:35 AM    Specimen: Urine   Result Value Ref Range    Color, UA Yellow Yellow, Light-Yellow, Dark Yellow, Love, Straw    Appearance, UA Turbid (A) Clear    Specific Gravity, UA 1.032     pH, UA 5.5 5.0 - 8.5    Protein, UA 1+ (A) Negative mg/dL    Glucose, UA Normal Negative, Normal mg/dL    Ketones, UA Negative Negative mg/dL    Blood, UA Negative Negative unit/L    Bilirubin, UA Negative Negative mg/dL    Urobilinogen, UA 1+ (A) 0.2, 1.0, Normal mg/dL    Nitrites, UA Negative Negative    Leukocyte Esterase, UA 75 (A) Negative unit/L    WBC, UA 6-10 (A) None Seen, 0-2, 3-5, 0-5 /HPF    Bacteria, UA Trace (A) None Seen /HPF    Squamous Epithelial Cells, UA Trace (A) None Seen /HPF    Mucous, UA Many (A) None Seen /LPF    Hyaline Casts, UA None Seen None Seen /lpf    Calcium Oxalate Crystals, UA Occ (A) None Seen /HPF    RBC, UA 0-5 None Seen, 0-2, 3-5, 0-5 /HPF   Drug Screen, Urine    Collection Time: 07/01/23 10:35 AM   Result Value Ref Range    Amphetamines, Urine Negative Negative    Barbituates, Urine Negative Negative    Benzodiazepine, Urine Negative Negative    Cannabinoids, Urine Positive  (A) Negative    Cocaine, Urine Positive (A) Negative    Fentanyl, Urine Negative Negative    MDMA, Urine Negative Negative    Opiates, Urine Negative Negative    Phencyclidine, Urine Negative Negative    pH, Urine 5.5 3.0 - 11.0   COVID-19 Rapid Screening    Collection Time: 07/01/23 10:35 AM   Result Value Ref Range    SARS COV-2 MOLECULAR Negative Negative   Comprehensive metabolic panel    Collection Time: 07/01/23 10:50 AM   Result Value Ref Range    Sodium Level 141 136 - 145 mmol/L    Potassium Level 4.0 3.5 - 5.1 mmol/L    Chloride 108 (H) 98 - 107 mmol/L    Carbon Dioxide 22 (L) 23 - 31 mmol/L    Glucose Level 98 82 - 115 mg/dL    Blood Urea Nitrogen 17.8 8.4 - 25.7 mg/dL    Creatinine 0.98 0.73 - 1.18 mg/dL    Calcium Level Total 10.7 (H) 8.8 - 10.0 mg/dL    Protein Total 8.5 (H) 5.8 - 7.6 gm/dL    Albumin Level 4.3 3.4 - 4.8 g/dL    Globulin 4.2 (H) 2.4 - 3.5 gm/dL    Albumin/Globulin Ratio 1.0 (L) 1.1 - 2.0 ratio    Bilirubin Total 0.6 <=1.5 mg/dL    Alkaline Phosphatase 90 40 - 150 unit/L    Alanine Aminotransferase 49 0 - 55 unit/L    Aspartate Aminotransferase 30 5 - 34 unit/L    eGFR >60 mls/min/1.73/m2   TSH    Collection Time: 07/01/23 10:50 AM   Result Value Ref Range    Thyroid Stimulating Hormone 0.773 0.350 - 4.940 uIU/mL   Ethanol    Collection Time: 07/01/23 10:50 AM   Result Value Ref Range    Ethanol Level <10.0 <=10.0 mg/dL   CBC with Differential    Collection Time: 07/01/23 10:50 AM   Result Value Ref Range    WBC 6.31 4.50 - 11.50 x10(3)/mcL    RBC 5.52 4.70 - 6.10 x10(6)/mcL    Hgb 15.8 14.0 - 18.0 g/dL    Hct 48.8 42.0 - 52.0 %    MCV 88.4 80.0 - 94.0 fL    MCH 28.6 27.0 - 31.0 pg    MCHC 32.4 (L) 33.0 - 36.0 g/dL    RDW 14.6 11.5 - 17.0 %    Platelet 281 130 - 400 x10(3)/mcL    MPV 9.9 7.4 - 10.4 fL    Neut % 46.1 %    Lymph % 36.6 %    Mono % 12.0 %    Eos % 4.0 %    Basophil % 1.0 %    Lymph # 2.31 0.6 - 4.6 x10(3)/mcL    Neut # 2.91 2.1 - 9.2 x10(3)/mcL    Mono # 0.76 0.1 - 1.3  x10(3)/mcL    Eos # 0.25 0 - 0.9 x10(3)/mcL    Baso # 0.06 <=0.2 x10(3)/mcL    IG# 0.02 0 - 0.04 x10(3)/mcL    IG% 0.3 %    NRBC% 0.0 %        No results found.       Assessment and Plan       Cocaine abuse   Alcohol abuse   History of hypertension on no meds

## 2023-07-02 NOTE — NURSING
"Daily Nursing Note:      Behavior:    Patient (Efren Ren is a 63 y.o. male, : 1960, MRN: [id number]) demonstrating an affect that was flat. Efren demonstrating mood that is depressed and anxious. Efren had an appearance that was disheveled. Efren endorses suicidal ideation. Efren denies suicide plan. Efren denies homicidal ideation. Efren denies hallucinations.    Efren's  height is 5' 10" (1.778 m) and weight is 93.9 kg (207 lb 0.2 oz). His temperature is 98.1 °F (36.7 °C). His blood pressure is 124/89 and his pulse is 90. His respiration is 20 and oxygen saturation is 100%.     Efrne's last BM was noted on: 2023.      Intervention:    Encourage Efren to perform self-hygiene, grooming, and changing of clothing. Monitor Efren's behavior and program compliance. Monitor Efren for suicidal ideation, homicidal ideation, sleep disturbance, and hallucinations. Encourage Efren to eat all portions of meals and assess for meal preferences. Monitor Efren for intake and output to ensure hydration. Notify the Physician/Physician Assistant/Advance Practice Registered Nurse (MD/PA/APRN) for any medication refusal and any change in patient condition.      Response:    Efren verbalizes understand of unit process and procedures.       Plan:     Continue to monitor per MD/PA/APRN orders; maintain patient safety.   "

## 2023-07-02 NOTE — NURSING
"PRN Administration Note:    Behavior:    Patient (Efren Ren is a 63 y.o. male, : 1960, MRN: [id number])     Allergies: Patient has no known allergies.    Efren's  height is 5' 10" (1.778 m) and weight is 93.9 kg (207 lb 0.2 oz). His temperature is 98.1 °F (36.7 °C). His blood pressure is 124/89 and his pulse is 90. His respiration is 20 and oxygen saturation is 100%.     Reason for PRN Administration: Efren requested medication for indigestion.    Intervention:    Administered Maalox 30ml PO PRN indigestion per physician order to Efren.      Response:    Efren tolerated administration well.      Plan:     Continue to monitor per MD/PA/APRN orders; and reevaluate medication effectiveness.  "

## 2023-07-02 NOTE — NURSING
"Daily Nursing Note:      Behavior:    Patient (Efren Ren is a 63 y.o. male, : 1960, MRN: [id number]) demonstrating an affect that was flat. Efren demonstrating mood that is depressed and anxious. Efren had an appearance that was disheveled and poor hygiene. Efren endorses suicidal ideation. Efren denies suicide plan. Efren denies homicidal ideation. Efren endorses auditory hallucinations.    Efren's  height is 5' 10" (1.778 m) and weight is 93.9 kg (207 lb 0.2 oz). His temperature is 98.1 °F (36.7 °C). His blood pressure is 124/89 and his pulse is 90. His respiration is 20 and oxygen saturation is 100%.     Efren's last BM was noted on: _2023  ______      Intervention:    Encourage Efren to perform self-hygiene, grooming, and changing of clothing. Monitor Efren's behavior and program compliance. Monitor Efren for suicidal ideation, homicidal ideation, sleep disturbance, and hallucinations. Encourage Efren to eat all portions of meals and assess for meal preferences. Monitor Efren for intake and output to ensure hydration. Notify the Physician/Physician Assistant/Advance Practice Registered Nurse (MD/PA/APRN) for any medication refusal and any change in patient condition.      Response:    Efren verbalizes understand of unit process and procedures. Efren reported medications Effectiveness with decreasing anxiety, depression and psychosis.______.      Plan:     Continue to monitor per MD/PA/APRN orders; maintain patient safety.    "

## 2023-07-03 LAB
BASOPHILS # BLD AUTO: 0.06 X10(3)/MCL
BASOPHILS NFR BLD AUTO: 1.1 %
EOSINOPHIL # BLD AUTO: 0.31 X10(3)/MCL (ref 0–0.9)
EOSINOPHIL NFR BLD AUTO: 5.5 %
ERYTHROCYTE [DISTWIDTH] IN BLOOD BY AUTOMATED COUNT: 14.5 % (ref 11.5–17)
HCT VFR BLD AUTO: 46 % (ref 42–52)
HGB BLD-MCNC: 14.8 G/DL (ref 14–18)
IMM GRANULOCYTES # BLD AUTO: 0.01 X10(3)/MCL (ref 0–0.04)
IMM GRANULOCYTES NFR BLD AUTO: 0.2 %
LYMPHOCYTES # BLD AUTO: 2.99 X10(3)/MCL (ref 0.6–4.6)
LYMPHOCYTES NFR BLD AUTO: 52.9 %
MCH RBC QN AUTO: 28.7 PG (ref 27–31)
MCHC RBC AUTO-ENTMCNC: 32.2 G/DL (ref 33–36)
MCV RBC AUTO: 89.1 FL (ref 80–94)
MONOCYTES # BLD AUTO: 0.66 X10(3)/MCL (ref 0.1–1.3)
MONOCYTES NFR BLD AUTO: 11.7 %
NEUTROPHILS # BLD AUTO: 1.62 X10(3)/MCL (ref 2.1–9.2)
NEUTROPHILS NFR BLD AUTO: 28.6 %
NRBC BLD AUTO-RTO: 0 %
PLATELET # BLD AUTO: 266 X10(3)/MCL (ref 130–400)
PMV BLD AUTO: 10.5 FL (ref 7.4–10.4)
RBC # BLD AUTO: 5.16 X10(6)/MCL (ref 4.7–6.1)
WBC # SPEC AUTO: 5.65 X10(3)/MCL (ref 4.5–11.5)

## 2023-07-03 PROCEDURE — 85025 COMPLETE CBC W/AUTO DIFF WBC: CPT | Performed by: NURSE PRACTITIONER

## 2023-07-03 PROCEDURE — 25000003 PHARM REV CODE 250: Performed by: NURSE PRACTITIONER

## 2023-07-03 PROCEDURE — 25000003 PHARM REV CODE 250: Performed by: PSYCHIATRY & NEUROLOGY

## 2023-07-03 PROCEDURE — 12400001 HC PSYCH SEMI-PRIVATE ROOM

## 2023-07-03 RX ORDER — ARIPIPRAZOLE 5 MG/1
5 TABLET ORAL DAILY
Status: DISCONTINUED | OUTPATIENT
Start: 2023-07-03 | End: 2023-07-05

## 2023-07-03 RX ADMIN — ARIPIPRAZOLE 5 MG: 5 TABLET ORAL at 11:07

## 2023-07-03 RX ADMIN — ALUMINUM HYDROXIDE, MAGNESIUM HYDROXIDE, AND SIMETHICONE 30 ML: 200; 200; 20 SUSPENSION ORAL at 11:07

## 2023-07-03 NOTE — NURSING
"PRN Administration Note:    Behavior:    Patient (Efren Ren is a 63 y.o. male, : 1960, MRN: [id number])     Allergies: Patient has no known allergies.    Efren's  height is 5' 10" (1.778 m) and weight is 93.9 kg (207 lb 0.2 oz). His temperature is 98.1 °F (36.7 °C). His blood pressure is 157/89 (abnormal) and his pulse is 83. His respiration is 18 and oxygen saturation is 97%.     Reason for PRN Administration: Efren reported increasing auditory hallucinations, nonspecific voices talking. Increasing anxiety and agitation unresponsive to redirection attempts.    Intervention:    Administered Haldol 10mg PO, Ativan 2 mg PO and Benadryl 50mg PO PRN increased anxiety, agitation, psychosis per physician order to Efren.      Response:    Efren tolerated administration well.      Plan:     Continue to monitor per MD/PA/APRN orders; and reevaluate medication effectiveness.  "

## 2023-07-03 NOTE — PROGRESS NOTES
"7/3/2023  Efren Ren   1960   [id number]        Psychiatry Progress Note     Chief Complaint: Relapse    SUBJECTIVE:   Efren Ren is a 63 y.o. male placed under a PEC at [facility] due to relapse on crack cocaine and alcohol use.  He then told the ED that he was having hallucinations to harm himself.    He was here in May and stating that he wanted to go to rehab.  Staff set up placement at [facility], then patient changed his mind just before discharge.  Returned home and relapsed.  Stating that he wants to go to rehab at discharge.  He is also fixated on us "helping him get his card" that he lost.  Will restart Abilify and will monitor response.  Will also f/u with staff regarding discharge planning.      UDS: (+)cocaine, cannabis  Blood alcohol: <10     Current Medications:   Scheduled Meds:    nicotine  1 patch Transdermal Daily    sertraline  50 mg Oral Daily      PRN Meds: acetaminophen, aluminum-magnesium hydroxide-simethicone, haloperidoL **AND** diphenhydrAMINE **AND** LORazepam **AND** haloperidol lactate **AND** diphenhydrAMINE **AND** lorazepam, hydrOXYzine HCL, magnesium hydroxide 400 mg/5 ml, ondansetron, promethazine, traZODone   Psychotherapeutics (From admission, onward)      Start     Stop Route Frequency Ordered    07/01/23 1700  sertraline tablet 50 mg         -- Oral Daily 07/01/23 1648    07/01/23 1331  traZODone tablet 100 mg         -- Oral Nightly PRN 07/01/23 1253    07/01/23 1328  haloperidoL tablet 10 mg  (Med - Acute  Behavioral Management)        See Hyperspace for full Linked Orders Report.    -- Oral Every 6 hours PRN 07/01/23 1253    07/01/23 1327  LORazepam tablet 2 mg  (Med - Acute  Behavioral Management)        See Hyperspace for full Linked Orders Report.    -- Oral Every 6 hours PRN 07/01/23 1253    07/01/23 1327  haloperidol lactate injection 10 mg  (Med - Acute  Behavioral Management)        See Hyperspace for full Linked Orders Report.    -- IM Every 6 hours PRN 07/01/23 " From: Patrica Hill  To: Sissy Rodriguez  Sent: 6/21/2023 9:01 AM CDT  Subject: breast surgeon    Dr Gill  [address] [facility] [address] [place] [address]  Office: [phone]   "[phone]    07/01/23 1327  LORazepam injection 2 mg  (Med - Acute  Behavioral Management)        See Rebecca for full Linked Orders Report.    -- IM Every 6 hours PRN 07/01/23 1253            Allergies:   Review of patient's allergies indicates:  No Known Allergies     OBJECTIVE:   Vitals   Vitals:    07/03/23 0701   BP: (!) 157/74   Pulse: 92   Resp: 18   Temp: 98.7 °F (37.1 °C)        Labs/Imaging/Studies:   Recent Results (from the past 36 hour(s))   CBC with Differential    Collection Time: 07/03/23  5:25 AM   Result Value Ref Range    WBC 5.65 4.50 - 11.50 x10(3)/mcL    RBC 5.16 4.70 - 6.10 x10(6)/mcL    Hgb 14.8 14.0 - 18.0 g/dL    Hct 46.0 42.0 - 52.0 %    MCV 89.1 80.0 - 94.0 fL    MCH 28.7 27.0 - 31.0 pg    MCHC 32.2 (L) 33.0 - 36.0 g/dL    RDW 14.5 11.5 - 17.0 %    Platelet 266 130 - 400 x10(3)/mcL    MPV 10.5 (H) 7.4 - 10.4 fL    Neut % 28.6 %    Lymph % 52.9 %    Mono % 11.7 %    Eos % 5.5 %    Basophil % 1.1 %    Lymph # 2.99 0.6 - 4.6 x10(3)/mcL    Neut # 1.62 (L) 2.1 - 9.2 x10(3)/mcL    Mono # 0.66 0.1 - 1.3 x10(3)/mcL    Eos # 0.31 0 - 0.9 x10(3)/mcL    Baso # 0.06 <=0.2 x10(3)/mcL    IG# 0.01 0 - 0.04 x10(3)/mcL    IG% 0.2 %    NRBC% 0.0 %          Medical Review Of Systems:  Constitutional: negative  Respiratory: negative  Cardiovascular: negative  Gastrointestinal: negative  Genitourinary:negative  Musculoskeletal:negative  Neurological: negative      Psychiatric Mental Status Exam:  General Appearance: appears stated age, well-developed, well-nourished  Arousal: alert  Behavior: cooperative  Movements and Motor Activity: no abnormal involuntary movements noted  Orientation: oriented to person, place, time, and situation  Speech: normal rate, normal rhythm, normal volume, normal tone  Mood: "Not doing too good"  Affect: mood-congruent  Thought Process: linear  Associations: intact  Thought Content and Perceptions: recent suicidal ideation reported, no homicidal ideation, no auditory hallucinations, " no visual hallucinations, no paranoid ideation, no ideas of reference  Recent and Remote Memory: recent memory intact, remote memory intact; per interview/observation with patient  Attention and Concentration: intact, attentive to conversation; per interview/observation with patient  Fund of Knowledge: intact, aware of current events, vocabulary appropriate; based on history, vocabulary, fund of knowledge, syntax, grammar, and content  Insight: questionable; based on understanding of severity of illness and HPI  Judgment: questionable; based on patient's behavior and HPI      ASSESSMENT/PLAN:   Problems Addressed/Diagnoses:  Unspecified Psychotic Disorder (F29)  Unspecified Mood disorder (F39)  Cocaine use disorder (F14.20)  Cannabis use disorder (F12.20)    Past Medical History:   Diagnosis Date    Hypertension         Plan:  Psychosis  -Restart Abilify 5mg daily     Mood  -Continue Zoloft     Cocaine use  -Group/Individual psychotherapy  -Recommend substance treatment program     Cannabis use  -Group/Individual psychotherapy  -Recommend substance treatment program    Expected Disposition Plan: Substance treatment program        Sylvain Pascal M.D.

## 2023-07-03 NOTE — NURSING
"PRN Medication Follow-up Note:    Behavior:    Patient (Efren Ren is a 63 y.o. male, : 1960, MRN: [id number])     Allergies: Patient has no known allergies.    Efren's  height is 5' 10" (1.778 m) and weight is 93.9 kg (207 lb 0.2 oz). His temperature is 98.1 °F (36.7 °C). His blood pressure is 157/89 (abnormal) and his pulse is 83. His respiration is 18 and oxygen saturation is 97%.     Administered Haldol 10mg PO, Ativan 2 mg PO and Benadryl 50mg PO PRN increased anxiety, agitation, psychosis per physician order to Efren.      Intervention:    Intervention to Efren's response: effective.       Response:    Efren's response: anxiety and agitation decreased, no further reports of auditory hallucinations.      Plan:     Continue to monitor per MD/PA/APRN orders; and reevaluate medication effectiveness.  "

## 2023-07-03 NOTE — CARE UPDATE
Rehab Referral    Rehab referral sent on pt's behalf to  [facility] and [facility] ([facility]) for post DC plans.

## 2023-07-03 NOTE — NURSING
"Daily Nursing Note:      Behavior:    Patient (Efren Ren is a 63 y.o. male, : 1960, MRN: [id number]) demonstrating an affect that was flat. Efren demonstrating mood that is depressed. Efren had an appearance that was clean. Efren denies suicidal ideation. Efren denies suicide plan. Efren denies homicidal ideation. Efren denies hallucinations.    Efren's  height is 5' 10" (1.778 m) and weight is 93.9 kg (207 lb 0.2 oz). His temperature is 98.7 °F (37.1 °C). His blood pressure is 166/83 (abnormal) and his pulse is 86. His respiration is 18 and oxygen saturation is 97%.       Intervention:    Encourage Efren to perform self-hygiene, grooming, and changing of clothing. Monitor Efren's behavior and program compliance. Monitor Efren for suicidal ideation, homicidal ideation, sleep disturbance, and hallucinations. Encourage Efren to eat all portions of meals and assess for meal preferences. Monitor Efren for intake and output to ensure hydration. Notify the Physician/Physician Assistant/Advance Practice Registered Nurse (MD/PA/APRN) for any medication refusal and any change in patient condition.      Response:    Efren verbalizes understand of unit process and procedures. Efren reported medicine "seems to be working"      Plan:     Continue to monitor per MD/PA/APRN orders; maintain patient safety.   "

## 2023-07-03 NOTE — PLAN OF CARE
Problem: Adult Inpatient Plan of Care  Goal: Plan of Care Review  Outcome: Met  Goal: Patient-Specific Goal (Individualized)  Outcome: Met  Goal: Absence of Hospital-Acquired Illness or Injury  Outcome: Met  Goal: Optimal Comfort and Wellbeing  Outcome: Met  Goal: Readiness for Transition of Care  Outcome: Met     Problem: Violence Risk or Actual  Goal: Anger and Impulse Control  Outcome: Ongoing, Progressing     Problem: Activity and Energy Impairment (Depressive Signs/Symptoms)  Goal: Optimized Energy Level (Depressive Signs/Symptoms)  Outcome: Ongoing, Progressing     Problem: Cognitive Impairment (Depressive Signs/Symptoms)  Goal: Optimized Cognitive Function  Outcome: Ongoing, Progressing     Problem: Decreased Participation/Engagement (Depressive Signs/Symptoms)  Goal: Increased Participation and Engagement (Depressive Signs/Symptoms)  Outcome: Ongoing, Progressing     Problem: Feelings of Worthlessness, Hopelessness or Excessive Guilt (Depressive Signs/Symptoms)  Goal: Enhanced Self-Esteem and Confidence (Depressive Signs/Symptoms)  Outcome: Ongoing, Progressing     Problem: Mood Impairment (Depressive Signs/Symptoms)  Goal: Improved Mood Symptoms (Depressive Signs/Symptoms)  Outcome: Ongoing, Progressing     Problem: Nutrition Imbalance (Depressive Signs/Symptoms)  Goal: Optimized Nutrition Intake  Outcome: Ongoing, Progressing     Problem: Psychomotor Impairment (Depressive Signs/Symptoms)  Goal: Improved Psychomotor Symptoms (Depressive Signs/Symptoms)  Outcome: Ongoing, Progressing     Problem: Sleep Disturbance (Depressive Signs/Symptoms)  Goal: Improved Sleep (Depressive Signs/Symptoms)  Outcome: Ongoing, Progressing     Problem: Social, Occupational or Functional Impairment (Depressive Signs/Symptoms)  Goal: Enhanced Social, Occupational or Functional Skills (Depressive Signs/Symptoms)  Outcome: Ongoing, Progressing     Problem: Activity and Energy Impairment (Excessive Substance Use)  Goal:  Optimized Energy Level (Excessive Substance Use)  Outcome: Ongoing, Progressing     Problem: Behavior Regulation Impairment (Excessive Substance Use)  Goal: Improved Behavioral Control (Excessive Substance Use)  Outcome: Ongoing, Progressing     Problem: Decreased Participation and Engagement (Excessive Substance Use)  Goal: Increased Participation and Engagement (Excessive Substance Use)  Outcome: Ongoing, Progressing     Problem: Physiologic Impairment (Excessive Substance Use)  Goal: Improved Physiologic Symptoms (Excessive Substance Use)  Outcome: Ongoing, Progressing     Problem: Social, Occupational or Functional Impairment (Excessive Substance Use)  Goal: Enhanced Social, Occupational or Functional Skills (Excessive Substance Use)  Outcome: Ongoing, Progressing

## 2023-07-03 NOTE — NURSING
"Daily Nursing Note:      Behavior:    Patient (Efren Ren is a 63 y.o. male, : 1960, MRN: [id number]) demonstrating an affect that was flat. Efren demonstrating mood that is depressed and anxious. Efren had an appearance that was disheveled. Efren endorses suicidal ideation. Efren denies suicide plan. Efren denies homicidal ideation. Efren endorses auditory hallucinations. Generalized paranoia, suspicious, anxious.    Efren's  height is 5' 10" (1.778 m) and weight is 93.9 kg (207 lb 0.2 oz). His temperature is 98.1 °F (36.7 °C). His blood pressure is 157/89 (abnormal) and his pulse is 83. His respiration is 18 and oxygen saturation is 97%.     Efren's last BM was noted on: 23.      Intervention:    Encourage Efren to perform self-hygiene, grooming, and changing of clothing. Monitor Efren's behavior and program compliance. Monitor Efren for suicidal ideation, homicidal ideation, sleep disturbance, and hallucinations. Encourage Efren to eat all portions of meals and assess for meal preferences. Monitor Efren for intake and output to ensure hydration. Notify the Physician/Physician Assistant/Advance Practice Registered Nurse (MD/PA/APRN) for any medication refusal and any change in patient condition.      Response:    Efren verbalizes understand of unit process and procedures. Efren is compliant with medications prescribed, no observed adverse effects noted.      Plan:     Continue to monitor per MD/PA/APRN orders; maintain patient safety.   "

## 2023-07-03 NOTE — GROUP NOTE
Group Psychotherapy       Group Focus: Psychiatric Medication Education      Number of patients in attendance: 16    Group Start Time: 2030  Group End Time:  2100  Groups Date: 7/2/2023  Group Topic:  Behavioral Health  Group Department: [facility]al [facility]  Group Facilitators:  Kavitha Diane RN  _____________________________________________________________________    Patient Name: Efren Ren  MRN: [id number]  Patient Class: IP- Psych   Admission Date\Time: 7/1/2023 12:48 PM  Hospital Length of Stay: 2  Primary Care Provider: Primary Doctor No     Referred by: Acute Psychiatry Unit Treatment Team     Target symptoms: Depression and Anxiety     Patient's response to treatment: Not Participating     Progress toward goals: Progressing slowly     Interval History:      Diagnosis: Depression     Plan: Continue treatment on [facility]

## 2023-07-03 NOTE — PLAN OF CARE
Psychosocial Assessment     Pt is a 63 y.o. YO  male admitted due to depression with SI. Pt UDS was Positive for THC and cocaine.  Pt ETOH < 10. Pt reports  AVH at this time. Pt reported voices are telling him to go get high and go out and party. Pt last inpatient  was May 2023. Pt reports [facility] in April-May 2023.Pt presents Cooperative with CM staff 1:1. Pt originally from [place], [place]. Pt has  nine dependents. Pt  is Single .  Pt completed Middle School. Pt denied  service.  Pt denies financial issues. Pt disabled.  Pt reports being on disability for 4 years. Pt denies legal issues. Pt states they do not receive comfort from spiritual practices. Pt emergency contact is Brother; Clemente Ren and Lorena Mikal. Their phone number is  Clemente [phone]; Lorena [phone]. Pt discharge plan at this time is rehab. Pt verbalized wanting to go to rehab; staff will send referral to [facility].        07/03/23 1033   Initial Information   Source of Information patient   Reason for Admission depression with SI   Patient Aware of Diagnosis yes   Arrived From emergency department   Current or Previous  Service none   Legal Status    Type of Admission Involuntary   Type of Involuntary Admission PEC   Spiritual Beliefs   Spiritual, Cultural Beliefs, Presybeterian Practices, Values that Affect Care no   Substance Use/Withdrawal   Substance Use Current, used prior to admission   Additional Tobacco Use   How many cigarettes do you typically have per day? 3   Abuse Screen (yes response referral indicated)   Feels Unsafe at Home or Work/School unable to answer (comment required)  (Pt reports being homeless)   Feels Threatened by Someone no   Does anyone try to keep you from having contact with others or doing things outside your home? no   Physical Signs of Abuse Present no   Abuse Details   Physical Abuse No   Sexual Abuse No   Emotional Abuse No   AUDIT-C (Alcohol Use Disorders ID Test)   Alcohol Use In Past  Year 3-->two to three times per week   Alcohol Amount Per Day In Past Year 1-->three or four   More Than 6 Drinks On One Occasion In Past Year 2-->monthly   Total Audit C Score 6

## 2023-07-04 PROCEDURE — 25000003 PHARM REV CODE 250: Performed by: NURSE PRACTITIONER

## 2023-07-04 PROCEDURE — 25000003 PHARM REV CODE 250: Performed by: PSYCHIATRY & NEUROLOGY

## 2023-07-04 PROCEDURE — 12400001 HC PSYCH SEMI-PRIVATE ROOM

## 2023-07-04 RX ADMIN — SERTRALINE HYDROCHLORIDE 50 MG: 50 TABLET ORAL at 09:07

## 2023-07-04 RX ADMIN — ARIPIPRAZOLE 5 MG: 5 TABLET ORAL at 09:07

## 2023-07-04 RX ADMIN — ALUMINUM HYDROXIDE, MAGNESIUM HYDROXIDE, AND SIMETHICONE 30 ML: 200; 200; 20 SUSPENSION ORAL at 05:07

## 2023-07-04 NOTE — PROGRESS NOTES
"Efren is a 63 male admitted for Unspecified Psychotic Disorder and Unspecified Mood disorder, Cocaine use disorder and Cannabis use disorder with a uds +cocaine & cannabis. Pt reports ability to perform his ADL's. CTRS educated Pt to Tr group times & dates, with Efren agreeing to attend with limited participation due to c/o L hip, L LE, and back pain. Efren reported his TR treatment goal as "go to rehab".       07/03/23 0818   General   Admit Date 07/01/23   Primary Diagnosis Unspecified Psychotic Disorder and Unspecified Mood disorder   Secondary Diagnosis Cocaine use disorder and Cannabis use disorder   Judaism Adventist   Number of Children 5   Children Living? 5   Occupation unemployed   Does the patient have dentures? No   If you were to take part in activities, which of the following would you prefer? Activities that you do alone   Do you feel like you have enough to keep you busy now? Yes   Do you believe that you have the opportunity for physical activity? Yes   Activity Capabilities Minimum   Subjective   Patient states I relapsed   Assessment   Mobility ambulates independently   Transfers independently   Musculoskeletal pain;other (see comments)  (c/o L hip, LE and back pain)   Visual Acuity normal vision   Visual Perception depth perception;color perception   Hearing normal   Speech/Communication normal   Cognitive Concerns unable to read;unable to write;problem solving   Emotional Concerns appears isolated   Leisure Interest Survey   Leisure Interest Survey Yes  (Working on cars)   Goals   Additional Documentation yes   Goal Formulation With patient   Time For Goal Achievement 7 days   Goal 1 go to rehab   Plan   Planned Therapy Intervention Group Recreational Therapy   Expected Length of Stay 5-7days   PT Frequency Minimum of 3 visits per week       "

## 2023-07-04 NOTE — NURSING
"Daily Nursing Note:      Behavior:    Patient (Efren Ren is a 63 y.o. male, : 1960, MRN: [id number]) demonstrating an affect that was flat. Efren demonstrating mood that is pleasant and appropriate. Efren had an appearance that was poor hygiene. Efren denies suicidal ideation. Efren denies suicide plan. Efren denies homicidal ideation. Efren denies hallucinations.    Efren's  height is 5' 10" (1.778 m) and weight is 93.9 kg (207 lb 0.2 oz). His temperature is 98.2 °F (36.8 °C). His blood pressure is 183/76 (abnormal) and his pulse is 72. His respiration is 20 and oxygen saturation is 100%.       Intervention:    Encourage Efren to perform self-hygiene, grooming, and changing of clothing. Monitor Efren's behavior and program compliance. Monitor Efren for suicidal ideation, homicidal ideation, sleep disturbance, and hallucinations. Encourage Efren to eat all portions of meals and assess for meal preferences. Monitor Efren for intake and output to ensure hydration. Notify the Physician/Physician Assistant/Advance Practice Registered Nurse (MD/PA/APRN) for any medication refusal and any change in patient condition.      Response:    Efren verbalizes understand of unit process and procedures. Efren reported medications  is working fine.      Plan:     Continue to monitor per MD/PA/APRN orders; maintain patient safety.   "

## 2023-07-04 NOTE — NURSING
"Daily Nursing Note:      Behavior:    Patient (Efren Ren is a 63 y.o. male, : 1960, MRN: [id number]) demonstrating an affect that was flat. Efren demonstrating mood that is depressed. Efren had an appearance that was clean. Efren denies suicidal ideation. Efren denies suicide plan. Efren denies homicidal ideation. Efren denies hallucinations.    Efren's  height is 5' 10" (1.778 m) and weight is 93.9 kg (207 lb 0.2 oz). His temperature is 98.7 °F (37.1 °C). His blood pressure is 166/83 (abnormal) and his pulse is 86. His respiration is 18 and oxygen saturation is 97%.       Intervention:    Encourage Efren to perform self-hygiene, grooming, and changing of clothing. Monitor Efren's behavior and program compliance. Monitor Efren for suicidal ideation, homicidal ideation, sleep disturbance, and hallucinations. Encourage Efren to eat all portions of meals and assess for meal preferences. Monitor Efren for intake and output to ensure hydration. Notify the Physician/Physician Assistant/Advance Practice Registered Nurse (MD/PA/APRN) for any medication refusal and any change in patient condition.      Response:    Efren verbalizes understand of unit process and procedures. Efren reported medicine "seems to be working"      Plan:     Continue to monitor per MD/PA/APRN orders; maintain patient safety.   "

## 2023-07-04 NOTE — PLAN OF CARE
Problem: Violence Risk or Actual  Goal: Anger and Impulse Control  Outcome: Met     Problem: Activity and Energy Impairment (Depressive Signs/Symptoms)  Goal: Optimized Energy Level (Depressive Signs/Symptoms)  Outcome: Ongoing, Progressing     Problem: Cognitive Impairment (Depressive Signs/Symptoms)  Goal: Optimized Cognitive Function  Outcome: Ongoing, Progressing     Problem: Decreased Participation/Engagement (Depressive Signs/Symptoms)  Goal: Increased Participation and Engagement (Depressive Signs/Symptoms)  Outcome: Ongoing, Progressing     Problem: Feelings of Worthlessness, Hopelessness or Excessive Guilt (Depressive Signs/Symptoms)  Goal: Enhanced Self-Esteem and Confidence (Depressive Signs/Symptoms)  Outcome: Ongoing, Progressing     Problem: Mood Impairment (Depressive Signs/Symptoms)  Goal: Improved Mood Symptoms (Depressive Signs/Symptoms)  Outcome: Ongoing, Progressing     Problem: Nutrition Imbalance (Depressive Signs/Symptoms)  Goal: Optimized Nutrition Intake  Outcome: Ongoing, Progressing     Problem: Psychomotor Impairment (Depressive Signs/Symptoms)  Goal: Improved Psychomotor Symptoms (Depressive Signs/Symptoms)  Outcome: Ongoing, Progressing     Problem: Sleep Disturbance (Depressive Signs/Symptoms)  Goal: Improved Sleep (Depressive Signs/Symptoms)  Outcome: Ongoing, Progressing     Problem: Social, Occupational or Functional Impairment (Depressive Signs/Symptoms)  Goal: Enhanced Social, Occupational or Functional Skills (Depressive Signs/Symptoms)  Outcome: Ongoing, Progressing     Problem: Activity and Energy Impairment (Excessive Substance Use)  Goal: Optimized Energy Level (Excessive Substance Use)  Outcome: Ongoing, Progressing     Problem: Behavior Regulation Impairment (Excessive Substance Use)  Goal: Improved Behavioral Control (Excessive Substance Use)  Outcome: Ongoing, Progressing     Problem: Decreased Participation and Engagement (Excessive Substance Use)  Goal: Increased  Participation and Engagement (Excessive Substance Use)  Outcome: Ongoing, Progressing     Problem: Physiologic Impairment (Excessive Substance Use)  Goal: Improved Physiologic Symptoms (Excessive Substance Use)  Outcome: Ongoing, Progressing     Problem: Social, Occupational or Functional Impairment (Excessive Substance Use)  Goal: Enhanced Social, Occupational or Functional Skills (Excessive Substance Use)  Outcome: Ongoing, Progressing

## 2023-07-04 NOTE — PROGRESS NOTES
"7/4/2023  Efren Ren   1960   [id number]        Psychiatry Progress Note     Chief Complaint: Relapse    SUBJECTIVE:   Efren Ren is a 63 y.o. male placed under a PEC at [facility] due to relapse on crack cocaine and alcohol use.  He then told the ED that he was having hallucinations to harm himself.    Today patient states that he is feeling "OK". He states that since restarting the medication he states that he feels a little antsy. We will monitor this and adjust as necessary. States that last night he slept very well. He states that his appetite is good as well. Will continue with current POC.        UDS: (+)cocaine, cannabis  Blood alcohol: <10     Current Medications:   Scheduled Meds:    ARIPiprazole  5 mg Oral Daily    nicotine  1 patch Transdermal Daily    sertraline  50 mg Oral Daily      PRN Meds: acetaminophen, aluminum-magnesium hydroxide-simethicone, haloperidoL **AND** diphenhydrAMINE **AND** LORazepam **AND** haloperidol lactate **AND** diphenhydrAMINE **AND** lorazepam, hydrOXYzine HCL, magnesium hydroxide 400 mg/5 ml, ondansetron, promethazine, traZODone   Psychotherapeutics (From admission, onward)      Start     Stop Route Frequency Ordered    07/03/23 1115  ARIPiprazole tablet 5 mg         -- Oral Daily 07/03/23 1010    07/01/23 1700  sertraline tablet 50 mg         -- Oral Daily 07/01/23 1648    07/01/23 1331  traZODone tablet 100 mg         -- Oral Nightly PRN 07/01/23 1253    07/01/23 1328  haloperidoL tablet 10 mg  (Med - Acute  Behavioral Management)        See Hyperspace for full Linked Orders Report.    -- Oral Every 6 hours PRN 07/01/23 1253    07/01/23 1327  LORazepam tablet 2 mg  (Med - Acute  Behavioral Management)        See Hyperspace for full Linked Orders Report.    -- Oral Every 6 hours PRN 07/01/23 1253    07/01/23 1327  haloperidol lactate injection 10 mg  (Med - Acute  Behavioral Management)        See Hyperspace for full Linked Orders Report.    -- IM Every 6 hours PRN " "07/01/23 1253    07/01/23 1327  LORazepam injection 2 mg  (Med - Acute  Behavioral Management)        See Hyperspace for full Linked Orders Report.    -- IM Every 6 hours PRN 07/01/23 1253            Allergies:   Review of patient's allergies indicates:  No Known Allergies     OBJECTIVE:   Vitals   Vitals:    07/04/23 1100   BP: (!) 183/76   Pulse: 72   Resp: 20   Temp: 98.2 °F (36.8 °C)        Labs/Imaging/Studies:   Recent Results (from the past 36 hour(s))   CBC with Differential    Collection Time: 07/03/23  5:25 AM   Result Value Ref Range    WBC 5.65 4.50 - 11.50 x10(3)/mcL    RBC 5.16 4.70 - 6.10 x10(6)/mcL    Hgb 14.8 14.0 - 18.0 g/dL    Hct 46.0 42.0 - 52.0 %    MCV 89.1 80.0 - 94.0 fL    MCH 28.7 27.0 - 31.0 pg    MCHC 32.2 (L) 33.0 - 36.0 g/dL    RDW 14.5 11.5 - 17.0 %    Platelet 266 130 - 400 x10(3)/mcL    MPV 10.5 (H) 7.4 - 10.4 fL    Neut % 28.6 %    Lymph % 52.9 %    Mono % 11.7 %    Eos % 5.5 %    Basophil % 1.1 %    Lymph # 2.99 0.6 - 4.6 x10(3)/mcL    Neut # 1.62 (L) 2.1 - 9.2 x10(3)/mcL    Mono # 0.66 0.1 - 1.3 x10(3)/mcL    Eos # 0.31 0 - 0.9 x10(3)/mcL    Baso # 0.06 <=0.2 x10(3)/mcL    IG# 0.01 0 - 0.04 x10(3)/mcL    IG% 0.2 %    NRBC% 0.0 %          Medical Review Of Systems:  Constitutional: negative  Respiratory: negative  Cardiovascular: negative  Gastrointestinal: negative  Genitourinary:negative  Musculoskeletal:negative  Neurological: negative      Psychiatric Mental Status Exam:  General Appearance: appears stated age, well-developed, well-nourished  Arousal: alert  Behavior: cooperative  Movements and Motor Activity: no abnormal involuntary movements noted  Orientation: oriented to person, place, time, and situation  Speech: normal rate, normal rhythm, normal volume, normal tone  Mood: "Not doing too good"  Affect: mood-congruent  Thought Process: linear  Associations: intact  Thought Content and Perceptions: recent suicidal ideation reported, no homicidal ideation, no auditory " hallucinations, no visual hallucinations, no paranoid ideation, no ideas of reference  Recent and Remote Memory: recent memory intact, remote memory intact; per interview/observation with patient  Attention and Concentration: intact, attentive to conversation; per interview/observation with patient  Fund of Knowledge: intact, aware of current events, vocabulary appropriate; based on history, vocabulary, fund of knowledge, syntax, grammar, and content  Insight: questionable; based on understanding of severity of illness and HPI  Judgment: questionable; based on patient's behavior and HPI      ASSESSMENT/PLAN:   Problems Addressed/Diagnoses:  Unspecified Psychotic Disorder (F29)  Unspecified Mood disorder (F39)  Cocaine use disorder (F14.20)  Cannabis use disorder (F12.20)    Past Medical History:   Diagnosis Date    Hypertension         Plan:  Psychosis  -Continue Abilify 5mg daily     Mood  -Continue Zoloft     Cocaine use  -Group/Individual psychotherapy  -Recommend substance treatment program     Cannabis use  -Group/Individual psychotherapy  -Recommend substance treatment program    Expected Disposition Plan: Substance treatment program        Corneilus Farley, [facility]

## 2023-07-04 NOTE — PLAN OF CARE
Problem: Violence Risk or Actual  Goal: Anger and Impulse Control  Outcome: Ongoing, Progressing     Problem: Activity and Energy Impairment (Depressive Signs/Symptoms)  Goal: Optimized Energy Level (Depressive Signs/Symptoms)  Outcome: Ongoing, Progressing     Problem: Cognitive Impairment (Depressive Signs/Symptoms)  Goal: Optimized Cognitive Function  Outcome: Ongoing, Progressing     Problem: Decreased Participation/Engagement (Depressive Signs/Symptoms)  Goal: Increased Participation and Engagement (Depressive Signs/Symptoms)  Outcome: Ongoing, Progressing     Problem: Feelings of Worthlessness, Hopelessness or Excessive Guilt (Depressive Signs/Symptoms)  Goal: Enhanced Self-Esteem and Confidence (Depressive Signs/Symptoms)  Outcome: Ongoing, Progressing     Problem: Mood Impairment (Depressive Signs/Symptoms)  Goal: Improved Mood Symptoms (Depressive Signs/Symptoms)  Outcome: Ongoing, Progressing     Problem: Nutrition Imbalance (Depressive Signs/Symptoms)  Goal: Optimized Nutrition Intake  Outcome: Ongoing, Progressing     Problem: Psychomotor Impairment (Depressive Signs/Symptoms)  Goal: Improved Psychomotor Symptoms (Depressive Signs/Symptoms)  Outcome: Ongoing, Progressing     Problem: Sleep Disturbance (Depressive Signs/Symptoms)  Goal: Improved Sleep (Depressive Signs/Symptoms)  Outcome: Ongoing, Progressing     Problem: Social, Occupational or Functional Impairment (Depressive Signs/Symptoms)  Goal: Enhanced Social, Occupational or Functional Skills (Depressive Signs/Symptoms)  Outcome: Ongoing, Progressing     Problem: Activity and Energy Impairment (Excessive Substance Use)  Goal: Optimized Energy Level (Excessive Substance Use)  Outcome: Ongoing, Progressing     Problem: Behavior Regulation Impairment (Excessive Substance Use)  Goal: Improved Behavioral Control (Excessive Substance Use)  Outcome: Ongoing, Progressing     Problem: Decreased Participation and Engagement (Excessive Substance  Use)  Goal: Increased Participation and Engagement (Excessive Substance Use)  Outcome: Ongoing, Progressing     Problem: Social, Occupational or Functional Impairment (Excessive Substance Use)  Goal: Enhanced Social, Occupational or Functional Skills (Excessive Substance Use)  Outcome: Ongoing, Progressing     Problem: Physiologic Impairment (Excessive Substance Use)  Goal: Improved Physiologic Symptoms (Excessive Substance Use)  Outcome: Ongoing, Progressing

## 2023-07-05 PROCEDURE — 12400001 HC PSYCH SEMI-PRIVATE ROOM

## 2023-07-05 PROCEDURE — 25000003 PHARM REV CODE 250: Performed by: NURSE PRACTITIONER

## 2023-07-05 PROCEDURE — 25000003 PHARM REV CODE 250: Performed by: PSYCHIATRY & NEUROLOGY

## 2023-07-05 RX ORDER — ARIPIPRAZOLE 5 MG/1
5 TABLET ORAL NIGHTLY
Status: DISCONTINUED | OUTPATIENT
Start: 2023-07-05 | End: 2023-07-06 | Stop reason: HOSPADM

## 2023-07-05 RX ORDER — SERTRALINE HYDROCHLORIDE 50 MG/1
100 TABLET, FILM COATED ORAL NIGHTLY
Status: DISCONTINUED | OUTPATIENT
Start: 2023-07-05 | End: 2023-07-06 | Stop reason: HOSPADM

## 2023-07-05 RX ADMIN — SERTRALINE HYDROCHLORIDE 50 MG: 50 TABLET ORAL at 08:07

## 2023-07-05 RX ADMIN — ARIPIPRAZOLE 5 MG: 5 TABLET ORAL at 08:07

## 2023-07-05 RX ADMIN — SERTRALINE HYDROCHLORIDE 100 MG: 50 TABLET ORAL at 08:07

## 2023-07-05 RX ADMIN — ALUMINUM HYDROXIDE, MAGNESIUM HYDROXIDE, AND SIMETHICONE 30 ML: 200; 200; 20 SUSPENSION ORAL at 03:07

## 2023-07-05 NOTE — NURSING
"PRN Administration Note:    Behavior:    Patient (Efren Ren is a 63 y.o. male, : 1960, MRN: [id number])     Allergies: Patient has no known allergies.    Efren's  height is 5' 10" (1.778 m) and weight is 93.9 kg (207 lb 0.2 oz). His temperature is 98.2 °F (36.8 °C). His blood pressure is 183/76 (abnormal) and his pulse is 72. His respiration is 20 and oxygen saturation is 100%.     Reason for PRN Administration: _reflux_________.    Intervention:    Administered _maalox______ per physician order to Efren       Response:    Efren tolerated administration well.      Plan:     Continue to monitor per MD/PA/APRN orders; and reevaluate medication effectiveness within 30 minutes.   "

## 2023-07-05 NOTE — NURSING
"PRN Medication Follow-up Note:    Behavior:    Patient (Efren Ren is a 63 y.o. male, : 1960, MRN: [id number])     Allergies: Patient has no known allergies.    Efren's  height is 5' 10" (1.778 m) and weight is 93.9 kg (207 lb 0.2 oz). His temperature is 98.2 °F (36.8 °C). His blood pressure is 183/76 (abnormal) and his pulse is 72. His respiration is 20 and oxygen saturation is 100%.     Administered maalox_______ per physician order to Efren       Intervention:    Intervention to Efren's response: _none needed________.       Response:    Efren's response: _no further c/o voiced________      Plan:     Continue to monitor per MD/PA/APRN orders; and reevaluate medication effectiveness within 30 minutes.   "

## 2023-07-05 NOTE — PLAN OF CARE
Problem: Activity and Energy Impairment (Depressive Signs/Symptoms)  Goal: Optimized Energy Level (Depressive Signs/Symptoms)  Outcome: Ongoing, Progressing     Problem: Cognitive Impairment (Depressive Signs/Symptoms)  Goal: Optimized Cognitive Function  Outcome: Ongoing, Progressing     Problem: Decreased Participation/Engagement (Depressive Signs/Symptoms)  Goal: Increased Participation and Engagement (Depressive Signs/Symptoms)  Outcome: Ongoing, Progressing     Problem: Feelings of Worthlessness, Hopelessness or Excessive Guilt (Depressive Signs/Symptoms)  Goal: Enhanced Self-Esteem and Confidence (Depressive Signs/Symptoms)  Outcome: Ongoing, Progressing     Problem: Mood Impairment (Depressive Signs/Symptoms)  Goal: Improved Mood Symptoms (Depressive Signs/Symptoms)  Outcome: Ongoing, Progressing     Problem: Nutrition Imbalance (Depressive Signs/Symptoms)  Goal: Optimized Nutrition Intake  Outcome: Ongoing, Progressing     Problem: Psychomotor Impairment (Depressive Signs/Symptoms)  Goal: Improved Psychomotor Symptoms (Depressive Signs/Symptoms)  Outcome: Ongoing, Progressing     Problem: Sleep Disturbance (Depressive Signs/Symptoms)  Goal: Improved Sleep (Depressive Signs/Symptoms)  Outcome: Ongoing, Progressing     Problem: Social, Occupational or Functional Impairment (Depressive Signs/Symptoms)  Goal: Enhanced Social, Occupational or Functional Skills (Depressive Signs/Symptoms)  Outcome: Ongoing, Progressing     Problem: Activity and Energy Impairment (Excessive Substance Use)  Goal: Optimized Energy Level (Excessive Substance Use)  Outcome: Ongoing, Progressing     Problem: Behavior Regulation Impairment (Excessive Substance Use)  Goal: Improved Behavioral Control (Excessive Substance Use)  Outcome: Ongoing, Progressing     Problem: Decreased Participation and Engagement (Excessive Substance Use)  Goal: Increased Participation and Engagement (Excessive Substance Use)  Outcome: Ongoing, Progressing      Problem: Physiologic Impairment (Excessive Substance Use)  Goal: Improved Physiologic Symptoms (Excessive Substance Use)  Outcome: Ongoing, Progressing     Problem: Social, Occupational or Functional Impairment (Excessive Substance Use)  Goal: Enhanced Social, Occupational or Functional Skills (Excessive Substance Use)  Outcome: Ongoing, Progressing

## 2023-07-05 NOTE — NURSING
Treatment Team Note:      Behavior:    Patient (Efren Ren is a 63 y.o. male, : 1960, MRN: [id number]) demonstrating an affect that was congruent. Efren demonstrating mood that is normal. Efren had an appearance that was clean. Efren denies suicidal ideation. Efren denies suicide plan. Efren denies hallucinations.      Intervention:    Encourage Efren to perform self-hygiene, grooming, and changing of clothing. Encourage Efren to attend all scheduled groups. Monitor Efren's behavior and program compliance. Monitor Efren for suicidal ideation, homicidal ideation, sleep disturbance, and hallucinations. Encourage Efren to eat all portions of meals and assess for meal preferences. Monitor Efren for intake and output to ensure hydration. Notify the Physician/Physician Assistant/Advance Practice Registered Nurse (MD/PA/APRN) for any medication refusal and any change in patient condition.    Discussed with Efren course of treatment. Discussed with Efren medications ordered and schedule of medications. Discussed collateral contact with Efren.      Response:    Efren's response to treatment team meeting was cooperative.  Efren advised Dr Pascal that he was tired.   Efren asked SS if they had a idea where he would be going on discharge, was advised with the holiday have not heard anything as of yet.  Efren did advise he wanted to do the 60 day rehab not the 90 because he had things to do.  Was advised it would be up to the rehab.      Plan:     Continue to monitor per MD/PA/APRN orders; maintain patient safety.

## 2023-07-05 NOTE — GROUP NOTE
Group Psychotherapy       Group Focus: Psychodynamic Group Psychotherapy      Number of patients in attendance: 13    Group Start Time: 2030  Group End Time:  2100  Groups Date: 7/5/2023  Group Topic:  Behavioral Health  Group Department: [facility]  Group Facilitators:  Neena Liang RN  _____________________________________________________________________    Patient Name: Efren Ren  MRN: [id number]  Patient Class: IP- Psych   Admission Date\Time: 7/1/2023 12:48 PM  Hospital Length of Stay: 4  Primary Care Provider: Primary Doctor No     Referred by: {OSMANY ENAMORADO REFERRED BY:[id number]}     Target symptoms: {Target Symptoms:17072}     Patient's response to treatment: {PSY IP PATIENT'S RESPONSE:14151}     Progress toward goals: {PSY IP PROGRESS TOWARD GOALS:32254}     Interval History: ***     Diagnosis: ***     Plan: {PSY IP PLAN:63109}

## 2023-07-05 NOTE — CARE UPDATE
Treatment Team    Pt seem for treatment team today with interdisciplinary team.  Pt is Cooperative with Tx team. Pt denies symptoms at this time. MD did not change pt meds at this time. Treatment teams goals Not met at this time. Pt DC plan is rehab. DC date scheduled for 7.7.2023.

## 2023-07-05 NOTE — PLAN OF CARE
Efren passively attends 50% of TR groups, is quiet and somnolent, interacts politely with peers, is cooperative with staff, and attend his ADL's. Efren attended treatment team, is pleasant & cooperative, reporting interest in residential rehab.

## 2023-07-05 NOTE — GROUP NOTE
Group Psychotherapy       Group Focus: Psychodynamic Group Psychotherapy      Number of patients in attendance: 13    Group Start Time: 2030  Group End Time:  2100  Groups Date: 7/5/2023  Group Topic:  Behavioral Health  Group Department: [facility] Behavioral Health Unit  Group Facilitators:  Neena Liang RN  _____________________________________________________________________    Patient Name: Efren Ren  MRN: [id number]  Patient Class: IP- Psych   Admission Date\Time: 7/1/2023 12:48 PM  Hospital Length of Stay: 4  Primary Care Provider: Primary Doctor No     Referred by: Behavioral Medicine Unit Treatment Team     Target symptoms: Depression     Patient's response to treatment: Active Listening     Progress toward goals: Progressing adequately     Interval History:      Diagnosis: Depression     Plan: Continue treatment on APU

## 2023-07-05 NOTE — NURSING
"Daily Nursing Note:      Behavior:    Patient (Efren Ren is a 63 y.o. male, : 1960, MRN: [id number]) demonstrating an affect that was anxious. Efren demonstrating mood that is anxious. Efren had an appearance that was poor hygiene. Efren denies suicidal ideation. Efren denies suicide plan. Efren denies homicidal ideation. Efren endorses hallucinations.    Efren's  height is 5' 10" (1.778 m) and weight is 93.9 kg (207 lb 0.2 oz). His temperature is 98.2 °F (36.8 °C). His blood pressure is 183/76 (abnormal) and his pulse is 72. His respiration is 20 and oxygen saturation is 100%.     Efren's last BM was noted on: _______      Intervention:    Encourage Efren to perform self-hygiene, grooming, and changing of clothing. Monitor Efren's behavior and program compliance. Monitor Efren for suicidal ideation, homicidal ideation, sleep disturbance, and hallucinations. Encourage Efren to eat all portions of meals and assess for meal preferences. Monitor Efren for intake and output to ensure hydration. Notify the Physician/Physician Assistant/Advance Practice Registered Nurse (MD/PA/APRN) for any medication refusal and any change in patient condition.      Response:    Efren verbalizes understand of unit process and procedures. Efren reported medications ______.      Plan:     Continue to monitor per MD/PA/APRN orders; maintain patient safety.   "

## 2023-07-05 NOTE — PLAN OF CARE
Problem: Activity and Energy Impairment (Depressive Signs/Symptoms)  Goal: Optimized Energy Level (Depressive Signs/Symptoms)  Outcome: Ongoing, Progressing     Problem: Cognitive Impairment (Depressive Signs/Symptoms)  Goal: Optimized Cognitive Function  Outcome: Ongoing, Progressing     Problem: Decreased Participation/Engagement (Depressive Signs/Symptoms)  Goal: Increased Participation and Engagement (Depressive Signs/Symptoms)  Outcome: Ongoing, Progressing     Problem: Feelings of Worthlessness, Hopelessness or Excessive Guilt (Depressive Signs/Symptoms)  Goal: Enhanced Self-Esteem and Confidence (Depressive Signs/Symptoms)  Outcome: Ongoing, Progressing     Problem: Mood Impairment (Depressive Signs/Symptoms)  Goal: Improved Mood Symptoms (Depressive Signs/Symptoms)  Outcome: Ongoing, Progressing     Problem: Nutrition Imbalance (Depressive Signs/Symptoms)  Goal: Optimized Nutrition Intake  Outcome: Ongoing, Progressing     Problem: Psychomotor Impairment (Depressive Signs/Symptoms)  Goal: Improved Psychomotor Symptoms (Depressive Signs/Symptoms)  Outcome: Ongoing, Progressing     Problem: Sleep Disturbance (Depressive Signs/Symptoms)  Goal: Improved Sleep (Depressive Signs/Symptoms)  Outcome: Ongoing, Progressing     Problem: Social, Occupational or Functional Impairment (Depressive Signs/Symptoms)  Goal: Enhanced Social, Occupational or Functional Skills (Depressive Signs/Symptoms)  Outcome: Ongoing, Progressing     Problem: Activity and Energy Impairment (Excessive Substance Use)  Goal: Optimized Energy Level (Excessive Substance Use)  Outcome: Ongoing, Progressing     Problem: Behavior Regulation Impairment (Excessive Substance Use)  Goal: Improved Behavioral Control (Excessive Substance Use)  Outcome: Ongoing, Progressing     Problem: Decreased Participation and Engagement (Excessive Substance Use)  Goal: Increased Participation and Engagement (Excessive Substance Use)  Outcome: Ongoing, Progressing      Problem: Physiologic Impairment (Excessive Substance Use)  Goal: Improved Physiologic Symptoms (Excessive Substance Use)  Outcome: Ongoing, Progressing     Problem: Social, Occupational or Functional Impairment (Excessive Substance Use)  Goal: Enhanced Social, Occupational or Functional Skills (Excessive Substance Use)  Outcome: Ongoing, Progressing      knee

## 2023-07-05 NOTE — PROGRESS NOTES
7/5/2023  Efren Ren   1960   [id number]        Psychiatry Progress Note     Chief Complaint: Relapse    SUBJECTIVE:   Efren Ren is a 63 y.o. male placed under a PEC at [facility] due to relapse on crack cocaine and alcohol use.  He then told the ED that he was having hallucinations to harm himself.    Staff working on discharge to [facility].  Today, he states that the medication makes him tired.  We discussed changed to HS dosing.  States that he still wants to go to rehab at discharge.  Will make this change and f/u with aftercare planning.      UDS: (+)cocaine, cannabis  Blood alcohol: <10     Current Medications:   Scheduled Meds:    ARIPiprazole  5 mg Oral Daily    nicotine  1 patch Transdermal Daily    sertraline  50 mg Oral Daily      PRN Meds: acetaminophen, aluminum-magnesium hydroxide-simethicone, haloperidoL **AND** diphenhydrAMINE **AND** LORazepam **AND** haloperidol lactate **AND** diphenhydrAMINE **AND** lorazepam, hydrOXYzine HCL, magnesium hydroxide 400 mg/5 ml, ondansetron, promethazine, traZODone   Psychotherapeutics (From admission, onward)      Start     Stop Route Frequency Ordered    07/03/23 1115  ARIPiprazole tablet 5 mg         -- Oral Daily 07/03/23 1010    07/01/23 1700  sertraline tablet 50 mg         -- Oral Daily 07/01/23 1648    07/01/23 1331  traZODone tablet 100 mg         -- Oral Nightly PRN 07/01/23 1253    07/01/23 1328  haloperidoL tablet 10 mg  (Med - Acute  Behavioral Management)        See Hyperspace for full Linked Orders Report.    -- Oral Every 6 hours PRN 07/01/23 1253    07/01/23 1327  LORazepam tablet 2 mg  (Med - Acute  Behavioral Management)        See Hyperspace for full Linked Orders Report.    -- Oral Every 6 hours PRN 07/01/23 1253    07/01/23 1327  haloperidol lactate injection 10 mg  (Med - Acute  Behavioral Management)        See Hyperspace for full Linked Orders Report.    -- IM Every 6 hours PRN 07/01/23 1253    07/01/23 1327  LORazepam injection 2 mg   "(Med - Acute  Behavioral Management)        [facility] Orders Report.    -- IM Every 6 hours PRN 07/01/23 1253            Allergies:   Review of patient's allergies indicates:  No Known Allergies     OBJECTIVE:   Vitals   Vitals:    07/05/23 0701   BP: (!) 174/69   Pulse: 79   Resp: 18   Temp: 97.9 °F (36.6 °C)        Labs/Imaging/Studies:   No results found for this or any previous visit (from the past 36 hour(s)).         Medical Review Of Systems:  Constitutional: negative  Respiratory: negative  Cardiovascular: negative  Gastrointestinal: negative  Genitourinary:negative  Musculoskeletal:negative  Neurological: negative      Psychiatric Mental Status Exam:  General Appearance: appears stated age, well-developed, well-nourished  Arousal: alert  Behavior: cooperative  Movements and Motor Activity: no abnormal involuntary movements noted  Orientation: oriented to person, place, time, and situation  Speech: normal rate, normal rhythm, normal volume, normal tone  Mood: "Not doing too good"  Affect: mood-congruent  Thought Process: linear  Associations: intact  Thought Content and Perceptions: recent suicidal ideation reported (improving), no homicidal ideation, no auditory hallucinations, no visual hallucinations, no paranoid ideation, no ideas of reference  Recent and Remote Memory: recent memory intact, remote memory intact; per interview/observation with patient  Attention and Concentration: intact, attentive to conversation; per interview/observation with patient  Fund of Knowledge: intact, aware of current events, vocabulary appropriate; based on history, vocabulary, fund of knowledge, syntax, grammar, and content  Insight: questionable; based on understanding of severity of illness and HPI  Judgment: questionable; based on patient's behavior and HPI      ASSESSMENT/PLAN:   Problems Addressed/Diagnoses:  Unspecified Psychotic Disorder (F29)  Unspecified Mood disorder (F39)  Cocaine use " disorder (F14.20)  Cannabis use disorder (F12.20)    Past Medical History:   Diagnosis Date    Hypertension         Plan:  Psychosis  -Change Abilify to HS     Mood  -Increase Zoloft to 100mg and change to HS     Cocaine use  -Group/Individual psychotherapy  -Recommend substance treatment program     Cannabis use  -Group/Individual psychotherapy  -Recommend substance treatment program    Expected Disposition Plan: Substance treatment program        Sylvain Pascal M.D.

## 2023-07-06 VITALS
WEIGHT: 207 LBS | HEIGHT: 70 IN | RESPIRATION RATE: 18 BRPM | TEMPERATURE: 98 F | HEART RATE: 83 BPM | BODY MASS INDEX: 29.63 KG/M2 | DIASTOLIC BLOOD PRESSURE: 84 MMHG | SYSTOLIC BLOOD PRESSURE: 134 MMHG | OXYGEN SATURATION: 96 %

## 2023-07-06 RX ORDER — SERTRALINE HYDROCHLORIDE 100 MG/1
100 TABLET, FILM COATED ORAL NIGHTLY
Qty: 30 TABLET | Refills: 0 | Status: SHIPPED | OUTPATIENT
Start: 2023-07-06 | End: 2023-10-17 | Stop reason: SDUPTHER

## 2023-07-06 RX ORDER — IBUPROFEN 200 MG
1 TABLET ORAL DAILY
Qty: 28 PATCH | Refills: 0 | Status: SHIPPED | OUTPATIENT
Start: 2023-07-06 | End: 2023-08-03

## 2023-07-06 RX ORDER — ARIPIPRAZOLE 5 MG/1
5 TABLET ORAL NIGHTLY
Qty: 30 TABLET | Refills: 0 | Status: SHIPPED | OUTPATIENT
Start: 2023-07-06 | End: 2023-10-17 | Stop reason: SDUPTHER

## 2023-07-06 NOTE — GROUP NOTE
Group Psychotherapy       Group Focus: Psychiatric Medication Education      Number of patients in attendance: 9    Group Start Time: 2030  Group End Time:  2100  Groups Date: 7/5/2023  Group Topic:  Behavioral Health  Group Department: [facility] Behavioral Health Unit  Group Facilitators:  Kavitha Diane RN  _____________________________________________________________________    Patient Name: Efren Ren  MRN: [id number]  Patient Class: IP- Psych   Admission Date\Time: 7/1/2023 12:48 PM  Hospital Length of Stay: 4  Primary Care Provider: Primary Doctor No     Referred by: Acute Psychiatry Unit Treatment Team     Target symptoms: Depression, Anxiety, and Psychosis     Patient's response to treatment: Active Listening     Progress toward goals: Progressing adequately     Interval History:      Diagnosis: Unspecified Psychotic Disorder; Unspecified Mood Disorder     Plan: Continue treatment on APU

## 2023-07-06 NOTE — NURSING
"Daily Nursing Note:      Behavior:    Patient (Efren Ren is a 63 y.o. male, : 1960, MRN: [id number]) demonstrating an affect that was blunted.  Efren had an appearance that was disheveled. Efren denies current suicidal ideation. Efren denies current homicidal ideation. Efren does report that the auditory hallucinations of hearing nonspecific voices talking to him are getting better. Denies any current hallucinations at present time. Reports that his anxiety is getting better, as well as his depression.    Efren's  height is 5' 10" (1.778 m) and weight is 93.9 kg (207 lb 0.2 oz). His temperature is 97.9 °F (36.6 °C). His blood pressure is 174/69 (abnormal) and his pulse is 79. His respiration is 18 and oxygen saturation is 98%.     Efren's last BM was noted on: 23.      Intervention:    Encourage Efren to perform self-hygiene, grooming, and changing of clothing. Monitor Efren's behavior and program compliance. Monitor Efren for suicidal ideation, homicidal ideation, sleep disturbance, and hallucinations. Encourage Efren to eat all portions of meals and assess for meal preferences. Monitor Efren for intake and output to ensure hydration. Notify the Physician/Physician Assistant/Advance Practice Registered Nurse (MD/PA/APRN) for any medication refusal and any change in patient condition.      Response:    Efren verbalizes understand of unit process and procedures. Efren reported medications are helping with his symptoms of anxiety, depression and hallucinations. Does report that the medications make him sleepy during the day.      Plan:     Continue to monitor per MD/PA/APRN orders; maintain patient safety.   "

## 2023-07-06 NOTE — PROGRESS NOTES
"7/6/2023  Efren Ren   1960   [id number]        Psychiatry Progress Note     Chief Complaint: Relapse    SUBJECTIVE:   Efren Ren is a 63 y.o. male placed under a PEC at [facility] due to relapse on crack cocaine and alcohol use.  He then told the ED that he was having hallucinations to harm himself.    Today pateint states that he is feeling better but still a little "so-so". Patient bed is now available at [place]. He states that he is tolerating his medication well without issue. He denies any SI/HI or AVH. Will proceed with discharge today to rehab.      UDS: (+)cocaine, cannabis  Blood alcohol: <10     Current Medications:   Scheduled Meds:    ARIPiprazole  5 mg Oral QHS    nicotine  1 patch Transdermal Daily    sertraline  100 mg Oral QHS      PRN Meds: acetaminophen, aluminum-magnesium hydroxide-simethicone, haloperidoL **AND** diphenhydrAMINE **AND** LORazepam **AND** haloperidol lactate **AND** diphenhydrAMINE **AND** lorazepam, hydrOXYzine HCL, magnesium hydroxide 400 mg/5 ml, ondansetron, promethazine, traZODone   Psychotherapeutics (From admission, onward)      Start     Stop Route Frequency Ordered    07/05/23 2100  ARIPiprazole tablet 5 mg         -- Oral Nightly 07/05/23 0947    07/05/23 2100  sertraline tablet 100 mg         -- Oral Nightly 07/05/23 0947    07/01/23 1331  traZODone tablet 100 mg         -- Oral Nightly PRN 07/01/23 1253    07/01/23 1328  haloperidoL tablet 10 mg  (Med - Acute  Behavioral Management)        See Hyperspace for full Linked Orders Report.    -- Oral Every 6 hours PRN 07/01/23 1253    07/01/23 1327  LORazepam tablet 2 mg  (Med - Acute  Behavioral Management)        See Hyperspace for full Linked Orders Report.    -- Oral Every 6 hours PRN 07/01/23 1253    07/01/23 1327  haloperidol lactate injection 10 mg  (Med - Acute  Behavioral Management)        See Hyperspace for full Linked Orders Report.    -- IM Every 6 hours PRN 07/01/23 1253    07/01/23 1327  LORazepam " "injection 2 mg  (Med - Acute  Behavioral Management)        See Hyperspace for full Linked Orders Report.    -- IM Every 6 hours PRN 07/01/23 1253            Allergies:   Review of patient's allergies indicates:  No Known Allergies     OBJECTIVE:   Vitals   Vitals:    07/06/23 0709   BP: 134/84   Pulse: 83   Resp: 18   Temp: 97.7 °F (36.5 °C)        Labs/Imaging/Studies:   No results found for this or any previous visit (from the past 36 hour(s)).         Medical Review Of Systems:  Constitutional: negative  Respiratory: negative  Cardiovascular: negative  Gastrointestinal: negative  Genitourinary:negative  Musculoskeletal:negative  Neurological: negative      Psychiatric Mental Status Exam:  General Appearance: appears stated age, well-developed, well-nourished  Arousal: alert  Behavior: cooperative  Movements and Motor Activity: no abnormal involuntary movements noted  Orientation: oriented to person, place, time, and situation  Speech: normal rate, normal rhythm, normal volume, normal tone  Mood: "Not doing too good"  Affect: mood-congruent  Thought Process: linear  Associations: intact  Thought Content and Perceptions: recent suicidal ideation reported (improving), no homicidal ideation, no auditory hallucinations, no visual hallucinations, no paranoid ideation, no ideas of reference  Recent and Remote Memory: recent memory intact, remote memory intact; per interview/observation with patient  Attention and Concentration: intact, attentive to conversation; per interview/observation with patient  Fund of Knowledge: intact, aware of current events, vocabulary appropriate; based on history, vocabulary, fund of knowledge, syntax, grammar, and content  Insight: questionable; based on understanding of severity of illness and HPI  Judgment: questionable; based on patient's behavior and HPI      ASSESSMENT/PLAN:   Problems Addressed/Diagnoses:  Unspecified Psychotic Disorder (F29)  Unspecified Mood disorder " (F39)  Cocaine use disorder (F14.20)  Cannabis use disorder (F12.20)    Past Medical History:   Diagnosis Date    Hypertension         Plan:  Psychosis  -Change Abilify to HS     Mood  -Zoloft to 100mg and change to HS     Cocaine use  -Group/Individual psychotherapy  -Recommend substance treatment program     Cannabis use  -Group/Individual psychotherapy  -Recommend substance treatment program    Expected Disposition Plan: Substance treatment program        Cornelius Farley, HUGOP-BC

## 2023-07-06 NOTE — NURSING
Call to [facility] in [place], [place], report given to  Ranjan. Informed him that pt would be sent with a pkt that will contain RX for dc meds, lab work,aftercare appts and educational material.

## 2023-07-06 NOTE — PLAN OF CARE
Problem: Activity and Energy Impairment (Depressive Signs/Symptoms)  Goal: Optimized Energy Level (Depressive Signs/Symptoms)  Outcome: Met     Problem: Cognitive Impairment (Depressive Signs/Symptoms)  Goal: Optimized Cognitive Function  Outcome: Met     Problem: Decreased Participation/Engagement (Depressive Signs/Symptoms)  Goal: Increased Participation and Engagement (Depressive Signs/Symptoms)  Outcome: Met     Problem: Feelings of Worthlessness, Hopelessness or Excessive Guilt (Depressive Signs/Symptoms)  Goal: Enhanced Self-Esteem and Confidence (Depressive Signs/Symptoms)  Outcome: Met     Problem: Mood Impairment (Depressive Signs/Symptoms)  Goal: Improved Mood Symptoms (Depressive Signs/Symptoms)  Outcome: Met     Problem: Nutrition Imbalance (Depressive Signs/Symptoms)  Goal: Optimized Nutrition Intake  Outcome: Met     Problem: Psychomotor Impairment (Depressive Signs/Symptoms)  Goal: Improved Psychomotor Symptoms (Depressive Signs/Symptoms)  Outcome: Met     Problem: Sleep Disturbance (Depressive Signs/Symptoms)  Goal: Improved Sleep (Depressive Signs/Symptoms)  Outcome: Met     Problem: Social, Occupational or Functional Impairment (Depressive Signs/Symptoms)  Goal: Enhanced Social, Occupational or Functional Skills (Depressive Signs/Symptoms)  Outcome: Met     Problem: Activity and Energy Impairment (Excessive Substance Use)  Goal: Optimized Energy Level (Excessive Substance Use)  Outcome: Met     Problem: Behavior Regulation Impairment (Excessive Substance Use)  Goal: Improved Behavioral Control (Excessive Substance Use)  Outcome: Met     Problem: Decreased Participation and Engagement (Excessive Substance Use)  Goal: Increased Participation and Engagement (Excessive Substance Use)  Outcome: Met     Problem: Physiologic Impairment (Excessive Substance Use)  Goal: Improved Physiologic Symptoms (Excessive Substance Use)  Outcome: Met     Problem: Social, Occupational or Functional Impairment  (Excessive Substance Use)  Goal: Enhanced Social, Occupational or Functional Skills (Excessive Substance Use)  Outcome: Met

## 2023-07-06 NOTE — NURSING
Pt is scheduled for discharge today to [facility] in [place], [place]. Pt is currently voicing no ADRs or physical complaints at this time, vital signs are stable, pt is not in any physical distress, pt reports he is ready for discharge, currently voicing no suicidal or homicidal ideations at this time, voicing no hallucinations or delusions at this time, voicing no detox symptoms at this time, pt will be given  Dc instructions, it will contain RX for dc meds,  Lab work, after care appts and educational material. SW will set up transportation to [facility], mht will pack up pt's belongings.  Pt will be brought up to dc area when transportation arrives.

## 2023-07-11 NOTE — DISCHARGE SUMMARY
"DISCHARGE SUMMARY  PSYCHIATRY      Admit Date: 7/1/2023 12:48 PM    Discharge Date:  7/6/2023    SITE:   [facility]    Discharge Attending Physician: Sylvain Pascal M.D.    Chief Complaint:  "I messed up bad yesterday."    History of Present Illness On Admit:   Efren Ren is a 63 y.o. male with past psychiatric history of major depressive disorder and polysubstance abuse, currently admitted with depression with SI after relapse.  Psychiatry has been consulted to address current symptoms.     Mr. Schwartz reports that he was discharged from a rehab facility in [place] on 5/5/23.  He says that he had remained alcohol and drug free up until yesterday.  He says that he needs to get this problem fixed.  He says that he stayed at a hotel last night and drank a fifth of whiskey and did crack all night.  He says that he does not know why he did it.  He says that he is usually homeless.  He says that he became homeless a while ago after he and his neighbor kept getting into it and he was evicted.  He says that he has been homeless since then.  He says that he sometimes sleeps on his cousin's porch or another cousin may let him spend the night.  He says that he feels like no one loves him.  He endorses SI with no plan.  He endorses feelings of hopelessness.  He reports anxiety related to his current living situation and things just not going right in life.  He says that he recently lost his check card.  He endorses AH of rambling voices.  He says that the voices started early this morning.  He denies VH or delusions.  He says that he feels very tired but had not been able to sleep recently.       Admit Mental Status Exam:  General Appearance: disheveled  Arousal: alert  Behavior: restless and fidgety  Movements and Motor Activity: no abnormal involuntary movements noted  Orientation: oriented to person, place, time, and situation  Speech: normal rate, rhythm, volume, " "tone and pitch  Mood: Depressed, Anxious, and Restless  Affect: mood-congruent, dysphoric  Thought Process: organized, logical  Associations: intact, no loosening of associations  Thought Content and Perceptions: + suicidal ideation, no homicidal ideation, + auditory hallucinations, no visual hallucinations, no paranoid ideation, no delusions  Recent and Remote Memory: intact  Attention and Concentration: intact, Can perform simple calculations; he says that he cannot spell or read.  He reports a 6th grade education.  Fund of Knowledge: unable to identify the   Insight: adequate  Judgment: poor      Diagnoses:  PRINCIPAL PROBLEM:  Moderate mood disorder      PROBLEM LIST    Moderate mood disorder    Psychotic disorder    Cocaine dependence, continuous    Cannabis dependence, continuous        Hospital Course:   Patient was admitted to [facility] and started on Zoloft 50mg daily.    7/3/23  He was here in May and stating that he wanted to go to rehab.  Staff set up placement at [facility], then patient changed his mind just before discharge.  Returned home and relapsed.  Stating that he wants to go to rehab at discharge.  He is also fixated on us "helping him get his card" that he lost.  Will restart Abilify and will monitor response.  Will also f/u with staff regarding discharge planning.        UDS: (+)cocaine, cannabis  Blood alcohol: <10    7/4/23  Today patient states that he is feeling "OK". He states that since restarting the medication he states that he feels a little antsy. We will monitor this and adjust as necessary. States that last night he slept very well. He states that his appetite is good as well. Will continue with current POC.    7/5/23  Staff working on discharge to [facility].  Today, he states that the medication makes him tired.  We discussed changed to HS dosing.  States that he still wants to go to rehab at discharge.  Will make this change and f/u with aftercare " "planning.    7/6/23  Today pateint states that he is feeling better but still a little "so-so". Patient bed is now available at [facility]. He states that he is tolerating his medication well without issue. He denies any SI/HI or AVH. Will proceed with discharge today to rehab.      DISCHARGE EXAMINATION    VITALS   Vitals:    07/04/23 0700 07/04/23 1100 07/05/23 0701 07/06/23 0709   BP: 125/85 (!) 183/76 (!) 174/69 134/84   BP Location:    Left arm   Patient Position:    Sitting   Pulse: 63 72 79 83   Resp: 18 20 18 18   Temp: 97.7 °F (36.5 °C) 98.2 °F (36.8 °C) 97.9 °F (36.6 °C) 97.7 °F (36.5 °C)   TempSrc:    Oral   SpO2: 97% 100% 98% 96%   Weight:       Height:             Discharge Mental Status Exam:  General Appearance: appears stated age, well-developed, well-nourished  Arousal: alert  Behavior: cooperative  Movements and Motor Activity: no abnormal involuntary movements noted  Orientation: oriented to person, place, time, and situation  Speech: normal rate, normal rhythm, normal volume, normal tone  Mood: "All right"  Affect: mood-congruent  Thought Process: linear  Associations: intact  Thought Content and Perceptions: no suicidal ideation, no homicidal ideation, no auditory hallucinations, no visual hallucinations, no paranoid ideation, no ideas of reference  Recent and Remote Memory: recent memory intact, remote memory intact; per interview/observation with patient  Attention and Concentration: intact, attentive to conversation; per interview/observation with patient  Fund of Knowledge: intact, aware of current events, vocabulary appropriate; based on history, vocabulary, fund of knowledge, syntax, grammar, and content  Insight: questionable; based on understanding of severity of illness and HPI  Judgment: questionable; based on patient's behavior and HPI        Discharge Condition:  Stable    Prognosis:  Fair    Justification for >1 antipsychotic:  N/a    Disposition:  [facility]    Follow-up:  [facility]      Medication Regimen:  No current facility-administered medications for this encounter.    Current Outpatient Medications:     ARIPiprazole (ABILIFY) 5 MG Tab, Take 1 tablet (5 mg total) by mouth every evening., Disp: 30 tablet, Rfl: 0    nicotine (NICODERM CQ) 21 mg/24 hr, Place 1 patch onto the skin once daily., Disp: 28 patch, Rfl: 0    sertraline (ZOLOFT) 100 MG tablet, Take 1 tablet (100 mg total) by mouth every evening., Disp: 30 tablet, Rfl: 0      Patient Instructions:   Continue medication regimen as prescribed.    Disposition plan per  - see  notes for details.    Patient instructed to call 911 or present to emergency department if any of the following complications develop status post discharge: suicidality, homicidality, or grave disability.     Total time spent discharging patient: <30 minutes      Sylvain Pascal M.D.

## 2023-08-21 ENCOUNTER — HOSPITAL ENCOUNTER (EMERGENCY)
Facility: HOSPITAL | Age: 63
Discharge: HOME | End: 2023-08-21
Attending: INTERNAL MEDICINE
Payer: MEDICAID

## 2023-08-21 VITALS
OXYGEN SATURATION: 95 % | TEMPERATURE: 98 F | SYSTOLIC BLOOD PRESSURE: 150 MMHG | DIASTOLIC BLOOD PRESSURE: 87 MMHG | RESPIRATION RATE: 18 BRPM | BODY MASS INDEX: 33.46 KG/M2 | HEART RATE: 79 BPM | WEIGHT: 233.69 LBS | HEIGHT: 70 IN

## 2023-08-21 DIAGNOSIS — R60.0 PERIPHERAL EDEMA: Primary | ICD-10-CM

## 2023-08-21 DIAGNOSIS — M54.50 CHRONIC BILATERAL LOW BACK PAIN WITHOUT SCIATICA: ICD-10-CM

## 2023-08-21 DIAGNOSIS — G89.29 CHRONIC BILATERAL LOW BACK PAIN WITHOUT SCIATICA: ICD-10-CM

## 2023-08-21 DIAGNOSIS — R35.0 URINARY FREQUENCY: ICD-10-CM

## 2023-08-21 LAB
ALBUMIN SERPL-MCNC: 3.9 G/DL (ref 3.4–4.8)
ALBUMIN/GLOB SERPL: 1 RATIO (ref 1.1–2)
ALP SERPL-CCNC: 97 UNIT/L (ref 40–150)
ALT SERPL-CCNC: 42 UNIT/L (ref 0–55)
APPEARANCE UR: CLEAR
AST SERPL-CCNC: 29 UNIT/L (ref 5–34)
BACTERIA #/AREA URNS AUTO: ABNORMAL /HPF
BASOPHILS # BLD AUTO: 0.08 X10(3)/MCL
BASOPHILS NFR BLD AUTO: 1.1 %
BILIRUB SERPL-MCNC: 0.3 MG/DL
BILIRUB UR QL STRIP.AUTO: NEGATIVE
BUN SERPL-MCNC: 16.1 MG/DL (ref 8.4–25.7)
CALCIUM SERPL-MCNC: 10.1 MG/DL (ref 8.8–10)
CHLORIDE SERPL-SCNC: 108 MMOL/L (ref 98–107)
CO2 SERPL-SCNC: 22 MMOL/L (ref 23–31)
COLOR UR: ABNORMAL
CREAT SERPL-MCNC: 0.86 MG/DL (ref 0.73–1.18)
EOSINOPHIL # BLD AUTO: 0.37 X10(3)/MCL (ref 0–0.9)
EOSINOPHIL NFR BLD AUTO: 5 %
ERYTHROCYTE [DISTWIDTH] IN BLOOD BY AUTOMATED COUNT: 15.4 % (ref 11.5–17)
GFR SERPLBLD CREATININE-BSD FMLA CKD-EPI: >60 MLS/MIN/1.73/M2
GLOBULIN SER-MCNC: 4 GM/DL (ref 2.4–3.5)
GLUCOSE SERPL-MCNC: 100 MG/DL (ref 82–115)
GLUCOSE UR QL STRIP.AUTO: NORMAL
HCT VFR BLD AUTO: 45.5 % (ref 42–52)
HGB BLD-MCNC: 14.8 G/DL (ref 14–18)
HYALINE CASTS #/AREA URNS LPF: ABNORMAL /LPF
IMM GRANULOCYTES # BLD AUTO: 0.04 X10(3)/MCL (ref 0–0.04)
IMM GRANULOCYTES NFR BLD AUTO: 0.5 %
KETONES UR QL STRIP.AUTO: NEGATIVE
LEUKOCYTE ESTERASE UR QL STRIP.AUTO: NEGATIVE
LYMPHOCYTES # BLD AUTO: 3.32 X10(3)/MCL (ref 0.6–4.6)
LYMPHOCYTES NFR BLD AUTO: 45 %
MCH RBC QN AUTO: 28.4 PG (ref 27–31)
MCHC RBC AUTO-ENTMCNC: 32.5 G/DL (ref 33–36)
MCV RBC AUTO: 87.3 FL (ref 80–94)
MONOCYTES # BLD AUTO: 0.87 X10(3)/MCL (ref 0.1–1.3)
MONOCYTES NFR BLD AUTO: 11.8 %
MUCOUS THREADS URNS QL MICRO: ABNORMAL /LPF
NEUTROPHILS # BLD AUTO: 2.7 X10(3)/MCL (ref 2.1–9.2)
NEUTROPHILS NFR BLD AUTO: 36.6 %
NITRITE UR QL STRIP.AUTO: NEGATIVE
NRBC BLD AUTO-RTO: 0 %
PH UR STRIP.AUTO: 5.5 [PH]
PLATELET # BLD AUTO: 248 X10(3)/MCL (ref 130–400)
PMV BLD AUTO: 9.9 FL (ref 7.4–10.4)
POTASSIUM SERPL-SCNC: 4 MMOL/L (ref 3.5–5.1)
PROT SERPL-MCNC: 7.9 GM/DL (ref 5.8–7.6)
PROT UR QL STRIP.AUTO: NEGATIVE
RBC # BLD AUTO: 5.21 X10(6)/MCL (ref 4.7–6.1)
RBC #/AREA URNS AUTO: ABNORMAL /HPF
RBC UR QL AUTO: NEGATIVE
SODIUM SERPL-SCNC: 140 MMOL/L (ref 136–145)
SP GR UR STRIP.AUTO: 1.02
SQUAMOUS #/AREA URNS LPF: ABNORMAL /HPF
UROBILINOGEN UR STRIP-ACNC: NORMAL
WBC # SPEC AUTO: 7.38 X10(3)/MCL (ref 4.5–11.5)
WBC #/AREA URNS AUTO: ABNORMAL /HPF

## 2023-08-21 PROCEDURE — 81001 URINALYSIS AUTO W/SCOPE: CPT | Performed by: NURSE PRACTITIONER

## 2023-08-21 PROCEDURE — 85025 COMPLETE CBC W/AUTO DIFF WBC: CPT | Performed by: NURSE PRACTITIONER

## 2023-08-21 PROCEDURE — 80053 COMPREHEN METABOLIC PANEL: CPT | Performed by: NURSE PRACTITIONER

## 2023-08-21 PROCEDURE — 99284 EMERGENCY DEPT VISIT MOD MDM: CPT

## 2023-08-21 RX ORDER — MELOXICAM 7.5 MG/1
7.5 TABLET ORAL DAILY PRN
Qty: 15 TABLET | Refills: 0 | OUTPATIENT
Start: 2023-08-21 | End: 2023-12-17

## 2023-08-21 NOTE — ED PROVIDER NOTES
Encounter Date: 8/21/2023       History     Chief Complaint   Patient presents with    Leg Swelling     Patient in with c/o BLE swelling and lower back pain for years     The patient presents with multiple chronic complaints that he has been dealing with for several years including low back pain, bilateral feet swelling, and frequent urination. He has a history of hypertension, depression, and schizophrenia. He denies chest pain, shortness of breath, and any other complaint at this time.      Review of patient's allergies indicates:  No Known Allergies  Past Medical History:   Diagnosis Date    Hypertension      Past Surgical History:   Procedure Laterality Date    HIP SURGERY Left     LEG SURGERY Left      No family history on file.  Social History     Tobacco Use    Smoking status: Every Day     Current packs/day: 0.00     Types: Cigarettes    Smokeless tobacco: Never     Review of Systems   Constitutional:  Negative for fever.   HENT:  Negative for sore throat.    Respiratory:  Negative for shortness of breath.    Cardiovascular:  Negative for chest pain.   Gastrointestinal:  Negative for nausea.   Genitourinary:  Positive for frequency. Negative for dysuria.   Musculoskeletal:  Positive for back pain and joint swelling.   Skin:  Negative for rash.   Neurological:  Negative for weakness.   Hematological:  Does not bruise/bleed easily.   All other systems reviewed and are negative.      Physical Exam     Initial Vitals [08/21/23 0741]   BP Pulse Resp Temp SpO2   (!) 150/87 79 18 97.8 °F (36.6 °C) 95 %      MAP       --         Physical Exam    Nursing note and vitals reviewed.  Constitutional: He appears well-developed and well-nourished.   HENT:   Head: Normocephalic and atraumatic.   Neck: Neck supple.   Normal range of motion.  Cardiovascular:  Normal rate, regular rhythm, normal heart sounds and intact distal pulses.           Pulmonary/Chest: Breath sounds normal.   Abdominal: Abdomen is soft. Bowel sounds  are normal.   Musculoskeletal:         General: Normal range of motion.      Cervical back: Normal range of motion and neck supple.      Comments: Back:  no c/t/l pt, normal reflexes, No costovertebral angle tenderness, , Thoracic: no vertebral point tenderness, Lumbar: Diffuse lateral mild tenderness, midline negative, no vertebral point tenderness, Sacral: no vertebral point tenderness, Testing: Straight leg raising, supine negative.     +1 pitting edema bilateral feet, feet warm with good pulses, NVI     Neurological: He is alert and oriented to person, place, and time. He has normal strength.   Skin: Skin is warm and dry.   Psychiatric: He has a normal mood and affect.         ED Course   Procedures  Labs Reviewed   COMPREHENSIVE METABOLIC PANEL - Abnormal; Notable for the following components:       Result Value    Chloride 108 (*)     Carbon Dioxide 22 (*)     Calcium Level Total 10.1 (*)     Protein Total 7.9 (*)     Globulin 4.0 (*)     Albumin/Globulin Ratio 1.0 (*)     All other components within normal limits   URINALYSIS, REFLEX TO URINE CULTURE - Abnormal; Notable for the following components:    Squamous Epithelial Cells, UA Trace (*)     Mucous, UA Trace (*)     All other components within normal limits   CBC WITH DIFFERENTIAL - Abnormal; Notable for the following components:    MCHC 32.5 (*)     All other components within normal limits   CBC W/ AUTO DIFFERENTIAL    Narrative:     The following orders were created for panel order CBC auto differential.  Procedure                               Abnormality         Status                     ---------                               -----------         ------                     CBC with Differential[[id number]]        Abnormal            Final result                 Please view results for these tests on the individual orders.          Imaging Results              X-Ray Lumbar Spine 2 Or 3 Views (Final result)  Result time 08/21/23 08:19:44      Final  result by Louis Schaefer MD (08/21/23 08:19:44)                   Narrative:    EXAMINATION  XR LUMBAR SPINE 2 OR 3 VIEWS    CLINICAL HISTORY  low back pain;    TECHNIQUE  A total of 3 images submitted of the lumbosacral spine.    COMPARISON  17 October 2022    FINDINGS  Vertebral segments: No cortical displacement, acute compression deformity, or focal lesion. There are similar degenerative endplate and facet changes. No evidence of traumatic subluxation.    Curvature: Normal curvature is maintained.    Disc spaces: Multilevel intervertebral space narrowing is present, chronic and degenerative in appearance.    Other findings: The partially visualized pelvic joint spaces are congruent and no focal irregularity of the bony pelvis is identified.  No acute or focal soft tissue abnormality.  Scattered aortoiliac calcification is again appreciated.    IMPRESSION  1. Lumbar spondylosis without evidence of acute abnormality.  2. Widespread vascular calcification.      Electronically signed by: Louis Schaefer  Date:    08/21/2023  Time:    08:19                                     Medications - No data to display  Medical Decision Making  The patient presents with multiple chronic complaints that he has been dealing with for several years including low back pain, bilateral feet swelling, and frequent urination. He has a history of hypertension, depression, and schizophrenia. He denies chest pain, shortness of breath, and any other complaint at this time.    8:55 AM DISPOSITION: The patient is resting comfortably in no acute distress.  He is hemodynamically stable and is without objective evidence for acute process requiring urgent intervention or hospitalization. I provided counseling to patient with regard to condition, the treatment plan, specific conditions for return, and the importance of follow up. Detailed written and verbal instructions provided to patient and he expressed a verbal understanding of the discharge  instructions and overall management plan. Reiterated the importance of medication administration and safety and advised patient to follow up with primary care provider in 3-5 days or sooner if needed.  Answered questions at this time. The patient is stable for discharge.         Amount and/or Complexity of Data Reviewed  Labs: ordered.  Radiology: ordered and independent interpretation performed.     Details: nothing acute      Additional MDM:   Differential Diagnosis:   Other: The following diagnoses were also considered and will be evaluated: peripheral edema, electrolyte imbalance. Lumbar fracture, spinal stenosis, epidural abscess, spine osteomyelitis, MS, cauda equina, among others                              Clinical Impression:   Final diagnoses:  [R60.9] Peripheral edema (Primary)  [M54.50, G89.29] Chronic bilateral low back pain without sciatica  [R35.0] Urinary frequency        ED Disposition Condition    Discharge Stable          ED Prescriptions       Medication Sig Dispense Start Date End Date Auth. Provider    meloxicam (MOBIC) 7.5 MG tablet Take 1 tablet (7.5 mg total) by mouth daily as needed for Pain. 15 tablet 8/21/2023 -- Skip Briceño ACNP          Follow-up Information       Follow up With Specialties Details Why Contact Info    referral sent to medicine clinic per request for a primary care provider        [facility] Emergency Medicine  If symptoms worsen [address] [facility] [id number] [phone]             Skip Briceño ACNP  08/21/23 6918

## 2023-10-17 ENCOUNTER — OFFICE VISIT (OUTPATIENT)
Dept: INTERNAL MEDICINE | Facility: CLINIC | Age: 63
End: 2023-10-17
Payer: MEDICAID

## 2023-10-17 ENCOUNTER — HOSPITAL ENCOUNTER (OUTPATIENT)
Dept: RADIOLOGY | Facility: HOSPITAL | Age: 63
Discharge: HOME | End: 2023-10-17
Attending: NURSE PRACTITIONER
Payer: MEDICAID

## 2023-10-17 VITALS
HEART RATE: 55 BPM | OXYGEN SATURATION: 100 % | HEIGHT: 70 IN | TEMPERATURE: 98 F | SYSTOLIC BLOOD PRESSURE: 127 MMHG | BODY MASS INDEX: 33.02 KG/M2 | WEIGHT: 230.63 LBS | DIASTOLIC BLOOD PRESSURE: 70 MMHG | RESPIRATION RATE: 20 BRPM

## 2023-10-17 DIAGNOSIS — R05.1 ACUTE COUGH: ICD-10-CM

## 2023-10-17 DIAGNOSIS — F20.9 SCHIZOPHRENIA, UNSPECIFIED TYPE: Chronic | ICD-10-CM

## 2023-10-17 DIAGNOSIS — Z00.00 WELLNESS EXAMINATION: ICD-10-CM

## 2023-10-17 DIAGNOSIS — R05.1 ACUTE COUGH: Primary | ICD-10-CM

## 2023-10-17 DIAGNOSIS — M54.9 DORSALGIA, UNSPECIFIED: Chronic | ICD-10-CM

## 2023-10-17 DIAGNOSIS — R35.0 URINARY FREQUENCY: ICD-10-CM

## 2023-10-17 PROCEDURE — 71046 X-RAY EXAM CHEST 2 VIEWS: CPT | Mod: TC

## 2023-10-17 PROCEDURE — 1160F PR REVIEW ALL MEDS BY PRESCRIBER/CLIN PHARMACIST DOCUMENTED: ICD-10-PCS | Mod: CPTII,,, | Performed by: NURSE PRACTITIONER

## 2023-10-17 PROCEDURE — 99204 PR OFFICE/OUTPT VISIT, NEW, LEVL IV, 45-59 MIN: ICD-10-PCS | Mod: S$PBB,,, | Performed by: NURSE PRACTITIONER

## 2023-10-17 PROCEDURE — 3074F PR MOST RECENT SYSTOLIC BLOOD PRESSURE < 130 MM HG: ICD-10-PCS | Mod: CPTII,,, | Performed by: NURSE PRACTITIONER

## 2023-10-17 PROCEDURE — 3044F HG A1C LEVEL LT 7.0%: CPT | Mod: CPTII,,, | Performed by: NURSE PRACTITIONER

## 2023-10-17 PROCEDURE — 1159F MED LIST DOCD IN RCRD: CPT | Mod: CPTII,,, | Performed by: NURSE PRACTITIONER

## 2023-10-17 PROCEDURE — 1160F RVW MEDS BY RX/DR IN RCRD: CPT | Mod: CPTII,,, | Performed by: NURSE PRACTITIONER

## 2023-10-17 PROCEDURE — 99215 OFFICE O/P EST HI 40 MIN: CPT | Mod: PBBFAC,25 | Performed by: NURSE PRACTITIONER

## 2023-10-17 PROCEDURE — 3044F PR MOST RECENT HEMOGLOBIN A1C LEVEL <7.0%: ICD-10-PCS | Mod: CPTII,,, | Performed by: NURSE PRACTITIONER

## 2023-10-17 PROCEDURE — 1159F PR MEDICATION LIST DOCUMENTED IN MEDICAL RECORD: ICD-10-PCS | Mod: CPTII,,, | Performed by: NURSE PRACTITIONER

## 2023-10-17 PROCEDURE — 3008F BODY MASS INDEX DOCD: CPT | Mod: CPTII,,, | Performed by: NURSE PRACTITIONER

## 2023-10-17 PROCEDURE — 3078F PR MOST RECENT DIASTOLIC BLOOD PRESSURE < 80 MM HG: ICD-10-PCS | Mod: CPTII,,, | Performed by: NURSE PRACTITIONER

## 2023-10-17 PROCEDURE — 99204 OFFICE O/P NEW MOD 45 MIN: CPT | Mod: S$PBB,,, | Performed by: NURSE PRACTITIONER

## 2023-10-17 PROCEDURE — 3074F SYST BP LT 130 MM HG: CPT | Mod: CPTII,,, | Performed by: NURSE PRACTITIONER

## 2023-10-17 PROCEDURE — 3008F PR BODY MASS INDEX (BMI) DOCUMENTED: ICD-10-PCS | Mod: CPTII,,, | Performed by: NURSE PRACTITIONER

## 2023-10-17 PROCEDURE — 3078F DIAST BP <80 MM HG: CPT | Mod: CPTII,,, | Performed by: NURSE PRACTITIONER

## 2023-10-17 RX ORDER — SERTRALINE HYDROCHLORIDE 100 MG/1
100 TABLET, FILM COATED ORAL NIGHTLY
Qty: 30 TABLET | Refills: 0 | Status: SHIPPED | OUTPATIENT
Start: 2023-10-17 | End: 2023-11-16

## 2023-10-17 RX ORDER — PANTOPRAZOLE SODIUM 40 MG/1
40 TABLET, DELAYED RELEASE ORAL DAILY
Qty: 90 TABLET | Refills: 1 | Status: SHIPPED | OUTPATIENT
Start: 2023-10-17

## 2023-10-17 RX ORDER — ARIPIPRAZOLE 5 MG/1
5 TABLET ORAL NIGHTLY
Qty: 30 TABLET | Refills: 0 | Status: SHIPPED | OUTPATIENT
Start: 2023-10-17 | End: 2023-11-16
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
## 2023-12-17 ENCOUNTER — HOSPITAL ENCOUNTER (EMERGENCY)
Facility: HOSPITAL | Age: 63
Discharge: HOME | End: 2023-12-17
Attending: INTERNAL MEDICINE
Payer: COMMERCIAL

## 2023-12-17 VITALS
WEIGHT: 229.25 LBS | SYSTOLIC BLOOD PRESSURE: 129 MMHG | TEMPERATURE: 98 F | BODY MASS INDEX: 32.82 KG/M2 | OXYGEN SATURATION: 100 % | HEART RATE: 64 BPM | HEIGHT: 70 IN | RESPIRATION RATE: 18 BRPM | DIASTOLIC BLOOD PRESSURE: 82 MMHG

## 2023-12-17 DIAGNOSIS — S16.1XXA STRAIN OF NECK MUSCLE, INITIAL ENCOUNTER: Primary | ICD-10-CM

## 2023-12-17 DIAGNOSIS — S39.012A STRAIN OF LUMBAR REGION, INITIAL ENCOUNTER: ICD-10-CM
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
## 2023-12-17 PROCEDURE — 99284 EMERGENCY DEPT VISIT MOD MDM: CPT

## 2023-12-17 PROCEDURE — 25000003 PHARM REV CODE 250: Performed by: NURSE PRACTITIONER

## 2023-12-17 RX ORDER — BACLOFEN 10 MG/1
10 TABLET ORAL 3 TIMES DAILY PRN
Qty: 21 TABLET | Refills: 0 | Status: SHIPPED | OUTPATIENT
Start: 2023-12-17

## 2023-12-17 RX ORDER — INDOMETHACIN 25 MG/1
25 CAPSULE ORAL 2 TIMES DAILY PRN
Qty: 14 CAPSULE | Refills: 0 | Status: SHIPPED | OUTPATIENT
Start: 2023-12-17

## 2023-12-17 RX ORDER — KETOROLAC TROMETHAMINE 10 MG/1
10 TABLET, FILM COATED ORAL
Status: COMPLETED | OUTPATIENT
Start: 2023-12-17 | End: 2023-12-17

## 2023-12-17 RX ADMIN — KETOROLAC TROMETHAMINE 10 MG: 10 TABLET, FILM COATED ORAL at 01:12
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
## 2024-07-17 DIAGNOSIS — B18.2 CHRONIC HEPATITIS C WITHOUT HEPATIC COMA: Primary | ICD-10-CM

## 2024-09-18 ENCOUNTER — OFFICE VISIT (OUTPATIENT)
Dept: URGENT CARE | Facility: CLINIC | Age: 64
End: 2024-09-18
Payer: MEDICAID

## 2024-09-18 VITALS
SYSTOLIC BLOOD PRESSURE: 154 MMHG | HEIGHT: 70 IN | WEIGHT: 227.5 LBS | RESPIRATION RATE: 18 BRPM | TEMPERATURE: 98 F | OXYGEN SATURATION: 100 % | DIASTOLIC BLOOD PRESSURE: 77 MMHG | BODY MASS INDEX: 32.57 KG/M2 | HEART RATE: 86 BPM

## 2024-09-18 DIAGNOSIS — K04.7 DENTAL ABSCESS: Primary | ICD-10-CM

## 2024-09-18 PROCEDURE — 99499 UNLISTED E&M SERVICE: CPT | Mod: S$PBB,,, | Performed by: NURSE PRACTITIONER

## 2024-09-18 PROCEDURE — 99215 OFFICE O/P EST HI 40 MIN: CPT | Mod: PBBFAC | Performed by: NURSE PRACTITIONER

## 2024-09-18 RX ORDER — IBUPROFEN 800 MG/1
800 TABLET ORAL 3 TIMES DAILY
Qty: 15 TABLET | Refills: 0 | Status: SHIPPED | OUTPATIENT
Start: 2024-09-18 | End: 2024-09-23

## 2024-09-18 RX ORDER — GABAPENTIN 300 MG/1
300 CAPSULE ORAL NIGHTLY
COMMUNITY
Start: 2024-09-12

## 2024-09-18 RX ORDER — AMLODIPINE BESYLATE 10 MG/1
10 TABLET ORAL
COMMUNITY
Start: 2024-09-12

## 2024-09-18 RX ORDER — OMEPRAZOLE 20 MG/1
20 CAPSULE, DELAYED RELEASE ORAL EVERY MORNING
COMMUNITY
Start: 2024-05-28

## 2024-09-18 RX ORDER — ERGOCALCIFEROL 1.25 MG/1
50000 CAPSULE ORAL
COMMUNITY
Start: 2024-05-28

## 2024-09-18 RX ORDER — CYCLOBENZAPRINE HCL 10 MG
10 TABLET ORAL 2 TIMES DAILY PRN
COMMUNITY
Start: 2024-05-07

## 2024-09-18 RX ORDER — PENICILLIN V POTASSIUM 500 MG/1
500 TABLET, FILM COATED ORAL EVERY 8 HOURS
Qty: 21 TABLET | Refills: 0 | Status: SHIPPED | OUTPATIENT
Start: 2024-09-18 | End: 2024-09-25

## 2024-09-18 NOTE — PROGRESS NOTES
"Subjective:      Patient ID: Efren Ren is a 64 y.o. male.    Vitals:  height is 5' 10" (1.778 m) and weight is 103.2 kg (227 lb 8 oz). His oral temperature is 98.4 °F (36.9 °C). His blood pressure is 154/77 (abnormal) and his pulse is 86. His respiration is 18 and oxygen saturation is 100%.     Chief Complaint: Facial Swelling ( Patient is a 64 y.o. male who presents to urgent care with complaints of left sided facial swelling and pain x3 days. Patient states that he had an abscess that popped on the right side last week. Alleviating factors include warm salt water gargles with mild amount of relief.)    HPI as stated in chief complaint.  Patient denies fever, headache. dizziness, weakness, shortness for breath or chest pain trouble breathing, stomach pain nausea or vomiting  ROS   Objective:     Physical Exam   Constitutional: He appears well-developed.  Non-toxic appearance. He does not appear ill. No distress.   HENT:   Head: Atraumatic.   Nose: No purulent discharge. Right sinus exhibits no maxillary sinus tenderness and no frontal sinus tenderness. Left sinus exhibits no maxillary sinus tenderness and no frontal sinus tenderness.   Mouth/Throat: Uvula is midline. Dental abscesses present.   Pimple like growth in gumline with pus like drainage noted to left upper mouth. Of teeth #11-12       Comments: Pimple like growth in gumline with pus like drainage noted to left upper mouth. Of teeth #11-12   Eyes: Right eye exhibits no discharge. Left eye exhibits no discharge. Extraocular movement intact   Neck: Neck supple. No neck rigidity present.   Cardiovascular: Regular rhythm.   Pulmonary/Chest: Effort normal and breath sounds normal. No respiratory distress. He has no wheezes. He has no rhonchi. He has no rales.   Lymphadenopathy:     He has no cervical adenopathy.   Neurological: He is alert.   Skin: Skin is warm, dry and not diaphoretic.   Psychiatric: His behavior is normal.   Nursing note and vitals " reviewed.      Assessment:     1. Dental abscess        Plan:   ER precautions given and discussed  -warm saltwater swishes tear mouth    You can take Tylenol as well. Up to 1,000mg at a time, up to 4 times per day.  Also consider Oragel.    Soft foods or liquids.  Stop smoking if you smoke.  Avoid sugar.    Mouthwash that does not contain alcohol.      - take antibiotics as prescribed   - if you get fever, increasing pain, increasing drainage, go to the ER.  Dental abscess  -     penicillin v potassium (VEETID) 500 MG tablet; Take 1 tablet (500 mg total) by mouth every 8 (eight) hours. for 7 days  Dispense: 21 tablet; Refill: 0  -     ibuprofen (ADVIL,MOTRIN) 800 MG tablet; Take 1 tablet (800 mg total) by mouth 3 (three) times daily. for 5 days  Dispense: 15 tablet; Refill: 0  -     Need aspiration of dental abscess

## 2024-09-18 NOTE — PROCEDURES
Need aspiration of dental abscess    Date/Time: 9/18/2024 11:45 AM    Performed by: Shamika Michel FNP  Authorized by: Shamika Michel FNP      Type:  Abscess  Body area:  Mouth (left upper gumline at missing or broken tooth #11-12)  Anesthetic total (ml):  0  Incision depth: subcutaneous    Complexity:  Simple  Drainage:  Pus, serous and purulent  Drainage amount:  Copious  Wound treatment:  Drainage (need aspiration)  Packing material:  None  Patient tolerance:  Patient tolerated the procedure well with no immediate complications  Pain Assessment: 0

## 2024-09-18 NOTE — PATIENT INSTRUCTIONS
Please follow instructions on patient education material.     Return to urgent care in 2 to 3 days if symptoms are not improving, immediately if you develop any new or worsening symptoms.   Abscess will continue to drain, do not swallow pus and blood.   - take antibiotics as prescribed   You continue to have signs of infection, such as:  Fever of 100.4°F (38°C) or higher  Swelling of the gums, neck, or face  Discharge or pus around a tooth  ?The pain is getting worse or keeps you from sleeping.  ?You have trouble swallowing, breathing, chewing, or opening your mouth all of the way.  ?You have jaw pain with ear, chest, shoulder, or arm pain.  - if you get fever, increasing pain, increasing drainage, go to the ER.    Take your prescriptions according to the directions on the labels and make sure you keep your upcoming dental appointment or obtain an appointment after approximately 7 days on antibiotics.  -warm saltwater swishes tear mouth    You can take Tylenol as well. Up to 1,000mg at a time, up to 4 times per day.  Also consider Oragel.    Soft foods or liquids.  Stop smoking if you smoke.  Avoid sugar.    Mouthwash that does not contain alcohol.

## 2024-09-24 ENCOUNTER — PROCEDURE VISIT (OUTPATIENT)
Dept: GASTROENTEROLOGY | Facility: CLINIC | Age: 64
End: 2024-09-24
Payer: MEDICAID

## 2024-09-24 DIAGNOSIS — B18.2 HEP C W/O COMA, CHRONIC: Primary | ICD-10-CM

## 2024-09-24 DIAGNOSIS — B18.2 CHRONIC HEPATITIS C WITHOUT HEPATIC COMA: ICD-10-CM

## 2024-09-24 PROCEDURE — 91200 LIVER ELASTOGRAPHY: CPT | Mod: PBBFAC | Performed by: STUDENT IN AN ORGANIZED HEALTH CARE EDUCATION/TRAINING PROGRAM

## 2024-09-24 NOTE — PROGRESS NOTES
Patient here for FibroScan visit. Reports NPO X3 hours.Female in room with patient during procedure. Position patient for the procedure to expose the right rib cage. Explained procedure to the patient. FibroScan exam complete and was tolerated without any problems.

## 2024-09-26 NOTE — PROCEDURES
Fibroscan Procedure     Name: Efren Ren  Date of Procedure :   Interpreting Physician: Power Kenyon MD  Diagnosis: HCV    Probe: XL    Fibroscan readin.9 kPa    Fibrosis: F 0-1     CAP readin dB/m    Steatosis: S2-S3      Miscellaneous:

## 2024-10-03 ENCOUNTER — LAB VISIT (OUTPATIENT)
Dept: LAB | Facility: HOSPITAL | Age: 64
End: 2024-10-03
Attending: NURSE PRACTITIONER
Payer: MEDICAID

## 2024-10-03 ENCOUNTER — OFFICE VISIT (OUTPATIENT)
Dept: INFECTIOUS DISEASES | Facility: CLINIC | Age: 64
End: 2024-10-03
Payer: MEDICAID

## 2024-10-03 VITALS
DIASTOLIC BLOOD PRESSURE: 68 MMHG | HEART RATE: 59 BPM | HEIGHT: 70 IN | WEIGHT: 226.63 LBS | RESPIRATION RATE: 12 BRPM | SYSTOLIC BLOOD PRESSURE: 128 MMHG | TEMPERATURE: 98 F | BODY MASS INDEX: 32.45 KG/M2

## 2024-10-03 DIAGNOSIS — B18.2 CHRONIC HEPATITIS C WITHOUT HEPATIC COMA: ICD-10-CM

## 2024-10-03 LAB
ALBUMIN SERPL-MCNC: 3.9 G/DL (ref 3.4–4.8)
ALBUMIN/GLOB SERPL: 1 RATIO (ref 1.1–2)
ALP SERPL-CCNC: 90 UNIT/L (ref 40–150)
ALT SERPL-CCNC: 47 UNIT/L (ref 0–55)
ANION GAP SERPL CALC-SCNC: 8 MEQ/L
AST SERPL-CCNC: 31 UNIT/L (ref 5–34)
BASOPHILS # BLD AUTO: 0.09 X10(3)/MCL
BASOPHILS NFR BLD AUTO: 1.2 %
BILIRUB SERPL-MCNC: 0.5 MG/DL
BUN SERPL-MCNC: 12.7 MG/DL (ref 8.4–25.7)
CALCIUM SERPL-MCNC: 10.5 MG/DL (ref 8.8–10)
CHLORIDE SERPL-SCNC: 108 MMOL/L (ref 98–107)
CO2 SERPL-SCNC: 23 MMOL/L (ref 23–31)
CREAT SERPL-MCNC: 0.89 MG/DL (ref 0.73–1.18)
CREAT/UREA NIT SERPL: 14
EOSINOPHIL # BLD AUTO: 0.35 X10(3)/MCL (ref 0–0.9)
EOSINOPHIL NFR BLD AUTO: 4.7 %
ERYTHROCYTE [DISTWIDTH] IN BLOOD BY AUTOMATED COUNT: 14.2 % (ref 11.5–17)
FERRITIN SERPL-MCNC: 354.4 NG/ML (ref 21.81–274.66)
GFR SERPLBLD CREATININE-BSD FMLA CKD-EPI: >60 ML/MIN/1.73/M2
GLOBULIN SER-MCNC: 4.1 GM/DL (ref 2.4–3.5)
GLUCOSE SERPL-MCNC: 88 MG/DL (ref 82–115)
HAV AB SER QL IA: REACTIVE
HAV IGM SERPL QL IA: NONREACTIVE
HBV CORE AB SERPL QL IA: REACTIVE
HBV SURFACE AB SER-ACNC: 2.65 MIU/ML
HBV SURFACE AB SERPL IA-ACNC: NONREACTIVE M[IU]/ML
HBV SURFACE AG SERPL QL IA: NONREACTIVE
HCT VFR BLD AUTO: 44.9 % (ref 42–52)
HGB BLD-MCNC: 14.9 G/DL (ref 14–18)
HIV 1+2 AB+HIV1 P24 AG SERPL QL IA: NONREACTIVE
IMM GRANULOCYTES # BLD AUTO: 0.02 X10(3)/MCL (ref 0–0.04)
IMM GRANULOCYTES NFR BLD AUTO: 0.3 %
INR PPP: 0.9
LYMPHOCYTES # BLD AUTO: 2.92 X10(3)/MCL (ref 0.6–4.6)
LYMPHOCYTES NFR BLD AUTO: 38.9 %
MCH RBC QN AUTO: 29.4 PG (ref 27–31)
MCHC RBC AUTO-ENTMCNC: 33.2 G/DL (ref 33–36)
MCV RBC AUTO: 88.6 FL (ref 80–94)
MONOCYTES # BLD AUTO: 0.9 X10(3)/MCL (ref 0.1–1.3)
MONOCYTES NFR BLD AUTO: 12 %
NEUTROPHILS # BLD AUTO: 3.23 X10(3)/MCL (ref 2.1–9.2)
NEUTROPHILS NFR BLD AUTO: 42.9 %
NRBC BLD AUTO-RTO: 0 %
PLATELET # BLD AUTO: 315 X10(3)/MCL (ref 130–400)
PMV BLD AUTO: 10 FL (ref 7.4–10.4)
POTASSIUM SERPL-SCNC: 4 MMOL/L (ref 3.5–5.1)
PROT SERPL-MCNC: 8 GM/DL (ref 5.8–7.6)
PROTHROMBIN TIME: 12.6 SECONDS (ref 11.4–14)
RBC # BLD AUTO: 5.07 X10(6)/MCL (ref 4.7–6.1)
SODIUM SERPL-SCNC: 139 MMOL/L (ref 136–145)
T PALLIDUM AB SER QL: REACTIVE
WBC # BLD AUTO: 7.51 X10(3)/MCL (ref 4.5–11.5)

## 2024-10-03 PROCEDURE — 80053 COMPREHEN METABOLIC PANEL: CPT

## 2024-10-03 PROCEDURE — 3078F DIAST BP <80 MM HG: CPT | Mod: CPTII,,, | Performed by: NURSE PRACTITIONER

## 2024-10-03 PROCEDURE — 99204 OFFICE O/P NEW MOD 45 MIN: CPT | Mod: S$PBB,,, | Performed by: NURSE PRACTITIONER

## 2024-10-03 PROCEDURE — 85025 COMPLETE CBC W/AUTO DIFF WBC: CPT

## 2024-10-03 PROCEDURE — 99214 OFFICE O/P EST MOD 30 MIN: CPT | Mod: PBBFAC | Performed by: NURSE PRACTITIONER

## 2024-10-03 PROCEDURE — 81596 NFCT DS CHRNC HCV 6 ASSAYS: CPT

## 2024-10-03 PROCEDURE — 82728 ASSAY OF FERRITIN: CPT

## 2024-10-03 PROCEDURE — 87522 HEPATITIS C REVRS TRNSCRPJ: CPT

## 2024-10-03 PROCEDURE — 3074F SYST BP LT 130 MM HG: CPT | Mod: CPTII,,, | Performed by: NURSE PRACTITIONER

## 2024-10-03 PROCEDURE — 86708 HEPATITIS A ANTIBODY: CPT

## 2024-10-03 PROCEDURE — 87389 HIV-1 AG W/HIV-1&-2 AB AG IA: CPT

## 2024-10-03 PROCEDURE — 36415 COLL VENOUS BLD VENIPUNCTURE: CPT

## 2024-10-03 PROCEDURE — 86709 HEPATITIS A IGM ANTIBODY: CPT

## 2024-10-03 PROCEDURE — 1159F MED LIST DOCD IN RCRD: CPT | Mod: CPTII,,, | Performed by: NURSE PRACTITIONER

## 2024-10-03 PROCEDURE — 86780 TREPONEMA PALLIDUM: CPT

## 2024-10-03 PROCEDURE — 4010F ACE/ARB THERAPY RXD/TAKEN: CPT | Mod: CPTII,,, | Performed by: NURSE PRACTITIONER

## 2024-10-03 PROCEDURE — 86704 HEP B CORE ANTIBODY TOTAL: CPT

## 2024-10-03 PROCEDURE — 85610 PROTHROMBIN TIME: CPT

## 2024-10-03 PROCEDURE — 3008F BODY MASS INDEX DOCD: CPT | Mod: CPTII,,, | Performed by: NURSE PRACTITIONER

## 2024-10-03 PROCEDURE — 86039 ANTINUCLEAR ANTIBODIES (ANA): CPT

## 2024-10-03 PROCEDURE — 86592 SYPHILIS TEST NON-TREP QUAL: CPT

## 2024-10-03 PROCEDURE — 87902 NFCT AGT GNTYP ALYS HEP C: CPT

## 2024-10-03 PROCEDURE — 86706 HEP B SURFACE ANTIBODY: CPT

## 2024-10-03 PROCEDURE — 87340 HEPATITIS B SURFACE AG IA: CPT

## 2024-10-03 RX ORDER — LOSARTAN POTASSIUM 50 MG/1
50 TABLET ORAL
COMMUNITY
Start: 2024-09-25
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
## 2024-10-04 LAB
ANA SER QL HEP2 SUBST: NORMAL
RPR SER QL: NORMAL

## 2024-10-05 LAB — HCV RNA SERPL NAA+PROBE-ACNC: ABNORMAL IU/ML

## 2024-10-07 LAB
A2 MACROGLOB SERPL-MCNC: 360 MG/DL (ref 100–280)
ALT SERPL W P-5'-P-CCNC: 53 U/L (ref 7–55)
ANNOTATION COMMENT IMP: ABNORMAL
APO A-I SERPL-MCNC: 115 MG/DL
BILIRUB SERPL-MCNC: 0.4 MG/DL (ref 0–1.2)
FIBROSIS STAGE SERPL QL: ABNORMAL
GGT SERPL-CCNC: 31 U/L (ref 8–61)
HAPTOGLOB SERPL NEPH-MCNC: 86 MG/DL (ref 30–200)
HCV GENTYP SERPL NAA+PROBE: ABNORMAL
LIVER FIBR SCORE SERPL CALC.FIBROSURE: 0.67
LIVER FIBROSIS INTERPRETATION SER-IMP: ABNORMAL
NECROINFLAMMATORY ACT GRADE SERPL QL: ABNORMAL
NECROINFLAMMATORY ACT SCORE SERPL: 0.44
NECROINFLAMMATORY ACTIV INTERP SER-IMP: ABNORMAL
SERIAL #: ABNORMAL
T PALLIDUM AB SER QL AGGL: POSITIVE

## 2024-10-17 ENCOUNTER — OFFICE VISIT (OUTPATIENT)
Dept: INFECTIOUS DISEASES | Facility: CLINIC | Age: 64
End: 2024-10-17
Payer: MEDICAID

## 2024-10-17 VITALS
DIASTOLIC BLOOD PRESSURE: 63 MMHG | HEIGHT: 70 IN | WEIGHT: 225.06 LBS | SYSTOLIC BLOOD PRESSURE: 112 MMHG | RESPIRATION RATE: 12 BRPM | HEART RATE: 84 BPM | BODY MASS INDEX: 32.22 KG/M2 | TEMPERATURE: 98 F

## 2024-10-17 DIAGNOSIS — B18.2 CHRONIC HEPATITIS C WITHOUT HEPATIC COMA: Primary | ICD-10-CM

## 2024-10-17 PROCEDURE — 99214 OFFICE O/P EST MOD 30 MIN: CPT | Mod: PBBFAC | Performed by: NURSE PRACTITIONER

## 2024-10-17 RX ORDER — CLINDAMYCIN HYDROCHLORIDE 300 MG/1
300 CAPSULE ORAL 3 TIMES DAILY
COMMUNITY
Start: 2024-10-09

## 2024-10-17 RX ORDER — GLECAPREVIR AND PIBRENTASVIR 40; 100 MG/1; MG/1
3 TABLET, FILM COATED ORAL DAILY
Qty: 84 TABLET | Refills: 1 | Status: SHIPPED | OUTPATIENT
Start: 2024-10-17

## 2024-10-17 NOTE — PROGRESS NOTES
Patient ID: Efren Ren 64 y.o.     Chief Complaint:   Chief Complaint   Patient presents with    Followup Hep C     Denies problems        HPI:    10/17/24  Mr. Schwartz is a 63 yo AAM here today for HCV f/u visit, accompanied by Lorena and Russ today.  Lab results reviewed. 10/3/24 HCV RNA 2.19 mil copies, GT 1a, A1-2 F3 disease.  Syphilis ab +, RPR NR.  He tells me that he does have a history of syphilis, treated at the local health unit when he was 15-16 years old. HIV NR.  Hep A immune. Hep B core ab +, Hep B s ag negative. He remains clean & sober. He takes pantoprazole 40 mg daily for severe gastritis.  Tried omeprazole in the past, not effective. He is not a candidate to hold, reduce, or replace PPI treatment. Will proceed with prescription for Mavyret 3 tabs daily for 8 weeks.  He is ready for treatment and will be assisted by Lorena to ensure adherence. He has no questions or concerns today.     10/3/24  Mr. Schwartz is a 63 yo AAM referred to [facility] by PCP FELICIA Mooney NP for newly diagnosed HCV infection (5/24).  He is accompanied by melinda Carrillo today. He is treatment naive and has a history significant for one jailhouse tattoo.  Denies any blood transfusion, IVDU, or sexual contact with HCV + partner. He was a plasma donor in the 80s and has 2 ear piercings. He has a long standing history of heavy alcohol use, quit drinking 3 months ago. He is feeling well today. He denies jaundice, icterus, nausea, vomiting, dark urine, or lima colored stools. No abdominal distension. Labs 5/28/24 confirm infection with HCV RNA 2.96 mil copies. He did have an u/s completed with [facility] that demonstrated nodular surface of liver on 6/13/24. However, FibroScan  completed 9/24/24 & resulted S2-3, F0-1. Will get additional labs today & plan for repeat u/s in about 3 months or so. Voiced understanding & appreciation. He is eager to move forward with treatment plan & agrees to adhere to recommendations. All questions  answered & concerns addressed.           Past Medical History:   Diagnosis Date    GERD (gastroesophageal reflux disease)     Hepatitis C     Hypertension     Unspecified cirrhosis of liver         Past Surgical History:   Procedure Laterality Date    HIP SURGERY Left     LEG SURGERY Left         Social History     Socioeconomic History    Marital status: Single   Tobacco Use    Smoking status: Every Day     Current packs/day: 0.25     Average packs/day: 0.3 packs/day for 49.8 years (12.4 ttl pk-yrs)     Types: Cigarettes     Start date: 1975    Smokeless tobacco: Never    Tobacco comments:     States smokes 1-2 cigarettes per day   Substance and Sexual Activity    Alcohol use: Not Currently     Comment: Stopped drinking    Drug use: Yes     Types: Marijuana     Comment: occassionaly    Sexual activity: Yes     Partners: Female     Birth control/protection: Condom     Social Drivers of Health     Financial Resource Strain: Low Risk  (10/17/2023)    Overall Financial Resource Strain (CARDIA)     Difficulty of Paying Living Expenses: Not very hard   Food Insecurity: Food Insecurity Present (10/17/2023)    Hunger Vital Sign     Worried About Running Out of Food in the Last Year: Sometimes true     Ran Out of Food in the Last Year: Sometimes true   Transportation Needs: Unmet Transportation Needs (10/17/2023)    PRAPARE - Transportation     Lack of Transportation (Medical): Yes     Lack of Transportation (Non-Medical): Yes   Physical Activity: Inactive (10/17/2023)    Exercise Vital Sign     Days of Exercise per Week: 0 days     Minutes of Exercise per Session: 0 min   Stress: Stress Concern Present (10/17/2023)    [employer] [place] [employer] - Occupational Stress Questionnaire     Feeling of Stress : Very much   Housing Stability: High Risk (10/17/2023)    Housing Stability Vital Sign     Unable to Pay for Housing in the Last Year: Yes     Number of Places Lived in the Last Year: 2     Unstable Housing in  "the Last Year: No        Family History   Problem Relation Name Age of Onset    Hyperlipidemia Mother      Hypertension Mother      Lung cancer Father      Diabetes Sister Chanle Ren     Diabetes Brother Nick     Bipolar disorder Daughter      Schizophrenia Daughter      No Known Problems Maternal Grandmother      No Known Problems Maternal Grandfather      No Known Problems Paternal Grandmother      No Known Problems Paternal Grandfather          Review of patient's allergies indicates:  No Known Allergies     Immunization History   Administered Date(s) Administered    COVID-19, MRNA, LN-S, PF (Pfizer) (Purple Cap) 11/19/2021        Review of Systems   Constitutional: Negative.    HENT: Negative.     Eyes: Negative.    Respiratory: Negative.     Cardiovascular: Negative.    Gastrointestinal: Negative.    Genitourinary: Negative.    Musculoskeletal: Negative.    Skin: Negative.    Neurological: Negative.    Endo/Heme/Allergies: Negative.    Psychiatric/Behavioral: Negative.     All other systems reviewed and are negative.         Objective:      /63 (BP Location: Right arm, Patient Position: Sitting)   Pulse 84   Temp 97.9 °F (36.6 °C) (Oral)   Resp 12   Ht 5' 10" (1.778 m)   Wt 102.1 kg (225 lb 1.4 oz)   BMI 32.30 kg/m²      Physical Exam  Vitals reviewed.   Constitutional:       General: He is not in acute distress.     Appearance: Normal appearance. He is not toxic-appearing.   Eyes:      General: No scleral icterus.  Cardiovascular:      Rate and Rhythm: Normal rate and regular rhythm.      Heart sounds: Normal heart sounds.   Pulmonary:      Effort: Pulmonary effort is normal. No respiratory distress.      Breath sounds: Normal breath sounds.   Abdominal:      General: Bowel sounds are normal. There is no distension.      Palpations: Abdomen is soft. There is no mass.      Tenderness: There is no abdominal tenderness.   Musculoskeletal:         General: Normal range of motion.   Skin:    "  General: Skin is warm and dry.   Neurological:      Mental Status: He is alert and oriented to person, place, and time.          Labs:   Lab Results   Component Value Date    WBC 7.51 10/03/2024    HGB 14.9 10/03/2024    HCT 44.9 10/03/2024    MCV 88.6 10/03/2024     10/03/2024       CMP  Sodium   Date Value Ref Range Status   10/03/2024 139 136 - 145 mmol/L Final     Potassium   Date Value Ref Range Status   10/03/2024 4.0 3.5 - 5.1 mmol/L Final     Chloride   Date Value Ref Range Status   10/03/2024 108 (H) 98 - 107 mmol/L Final     CO2   Date Value Ref Range Status   10/03/2024 23 23 - 31 mmol/L Final     Blood Urea Nitrogen   Date Value Ref Range Status   10/03/2024 12.7 8.4 - 25.7 mg/dL Final     Creatinine   Date Value Ref Range Status   10/03/2024 0.89 0.73 - 1.18 mg/dL Final     Calcium   Date Value Ref Range Status   10/03/2024 10.5 (H) 8.8 - 10.0 mg/dL Final     Albumin   Date Value Ref Range Status   10/03/2024 3.9 3.4 - 4.8 g/dL Final     Bilirubin Total   Date Value Ref Range Status   10/03/2024 0.5 <=1.5 mg/dL Final     ALP   Date Value Ref Range Status   10/03/2024 90 40 - 150 unit/L Final     AST   Date Value Ref Range Status   10/03/2024 31 5 - 34 unit/L Final     ALT   Date Value Ref Range Status   10/03/2024 47 0 - 55 unit/L Final     eGFR   Date Value Ref Range Status   10/03/2024 >60 mL/min/1.73/m2 Final     Lab Results   Component Value Date    TSH 0.870 10/17/2023     HCV Genotype   Date Value Ref Range Status   10/03/2024 1a (A) Undetected Final     Comment:        -------------------ADDITIONAL INFORMATION-------------------  This test was performed using the [employer] RealTime HCV   Genotype II assay ([employer] [place], [place]).     Test Performed by:  [facility] - [address] [address]  : Roger Rudd Ph.D.; CLIA# [id number]     HCV RNA Detect/Quant   Date Value Ref Range Status   10/03/2024  0113886 (A) Undetected IU/mL Final     Comment:     Result in log IU/mL is 6.34.     -------------------ADDITIONAL INFORMATION-------------------  The quantification range of this assay is 15 to 100,000,000   IU/mL (1.18 log to 8.00 log IU/mL). Testing was performed   using the steven HCV test ([employer]     Test Performed by:  [facility] [place]  [address] [place] [address]  : Roger Rudd Ph.D.; CLIA# [id number]     HIV   Date Value Ref Range Status   10/03/2024 Nonreactive Nonreactive Final     INR   Date Value Ref Range Status   10/03/2024 0.9 <=1.3 Final     Syphilis Antibody   Date Value Ref Range Status   10/03/2024 Reactive (A) Nonreactive, Equivocal Final     Hep BsAb Interp   Date Value Ref Range Status   10/03/2024 Nonreactive Nonreactive Final     Hep BsAb   Date Value Ref Range Status   10/03/2024 2.65 mIU/mL Final     Comment:     Interpretive Data Hep Bs Ab#  Result (mIU/mL)Interpretation - Hep B Surface Antibody  <8.00Nonreactive: Patient considered not immune to HBV infection  >=8.00 to <12.00Grayzone: Unable to determine immune status. Follow-up testing should be performed  >=12.00Reactive: Patient considered immune to HBV infection       Hep BcAb Interp   Date Value Ref Range Status   10/03/2024 Reactive (A) Nonreactive Final     Hep A IgG Interp   Date Value Ref Range Status   10/03/2024 Reactive (A) Nonreactive Final     FibroTest Stage   Date Value Ref Range Status   10/03/2024 F3  Final     ActiTest Grade   Date Value Ref Range Status   10/03/2024 A1-A2  Final     Alpha-2-Macroglobulin   Date Value Ref Range Status   10/03/2024 360 (H) 100 - 280 mg/dL Final     Haptoglobin   Date Value Ref Range Status   10/03/2024 86 30 - 200 mg/dL Final     Alanine Aminotransferase (ALT)   Date Value Ref Range Status   10/03/2024 53 7 - 55 U/L Final     Gamma Glutamyltransferase (GGT)   Date Value Ref Range Status    10/03/2024 31 8 - 61 U/L Final     Bilirubin, Total   Date Value Ref Range Status   10/03/2024 0.4 0.0 - 1.2 mg/dL Final     Comment:        Test Performed by:  [place] [address]  : Roger Rudd Ph.D.; CLIA# [id number]     Test Performed by:  [place] [address]  : Roger Rudd Ph.D.; CLIA# [id number]     Ferritin Level   Date Value Ref Range Status   10/03/2024 354.40 (H) 21.81 - 274.66 ng/mL Final     Antinuclear Ab, HEp-2 Substrate   Date Value Ref Range Status   10/03/2024 <1:80 (Negative) <1:80 (Negative) Final     Comment:        -------------------ADDITIONAL INFORMATION-------------------  Method: Immunofluorescence using HEp-2 cellular substrate.     Test Performed by:  [place] [address]  : Roger Rudd Ph.D.; CLIA# [id number]       Imaging: Reviewed most recent relevant imaging studies available, notable results highlighted in this note      Medications:     Current Outpatient Medications   Medication Instructions    amLODIPine (NORVASC) 10 mg    clindamycin (CLEOCIN) 300 mg, 3 times daily    cyclobenzaprine (FLEXERIL) 10 mg, 2 times daily PRN    ergocalciferol (ERGOCALCIFEROL) 50,000 Units, Every 7 days    gabapentin (NEURONTIN) 300 mg, Nightly    glecaprevir-pibrentasvir (MAVYRET) 100-40 mg Tab 3 tablets, Oral, Daily    indomethacin (INDOCIN) 25 mg, Oral, 2 times daily PRN    losartan (COZAAR) 50 mg    pantoprazole (PROTONIX) 40 mg, Oral, Daily    sertraline (ZOLOFT) 100 mg, Oral, Nightly       Assessment:       Problem List Items Addressed This Visit    None  Visit Diagnoses       Chronic hepatitis C without hepatic coma    -  Primary    Relevant Medications    glecaprevir-pibrentasvir (MAVYRET) 100-40 mg Tab                Plan:      Chronic hepatitis C without hepatic coma  -     glecaprevir-pibrentasvir (MAVYRET) 100-40 mg Tab; Take 3 tablets by mouth once daily.  Dispense: 84 tablet; Refill: 1  Diagnosed 5/2024.  Treatment naive.  GT 1a, baseline VL 2.96 mil (5/24), 2.19 mil (10/24)  Fibrosure: A1-2, F3.   FibroScan 9/24/24 S2-3, F0-1.  RUQ abdominal u/s: 6/24 nodular surface of liver. Will repeat in approximately 3 months.  Blood precautions: do not share a razor, needle, toothbrush, clippers with anyone.  Unable to reduce, replace or hold pantoprazole due to severe gastritis, has failed omeprazole in recent past.   Mavyret 3 tabs daily x 8 weeks.   Refer to HCV  to initiate PA & treatment protocol.  RTC per protocol with Debra.

## 2024-10-22 ENCOUNTER — OFFICE VISIT (OUTPATIENT)
Dept: URGENT CARE | Facility: CLINIC | Age: 64
End: 2024-10-22
Payer: MEDICAID

## 2024-10-22 VITALS
TEMPERATURE: 98 F | HEART RATE: 60 BPM | DIASTOLIC BLOOD PRESSURE: 69 MMHG | HEIGHT: 70 IN | OXYGEN SATURATION: 99 % | WEIGHT: 225.19 LBS | SYSTOLIC BLOOD PRESSURE: 133 MMHG | BODY MASS INDEX: 32.24 KG/M2 | RESPIRATION RATE: 18 BRPM

## 2024-10-22 DIAGNOSIS — L28.2 PRURITIC RASH: Primary | ICD-10-CM

## 2024-10-22 PROCEDURE — 99214 OFFICE O/P EST MOD 30 MIN: CPT | Mod: PBBFAC

## 2024-10-22 PROCEDURE — 99213 OFFICE O/P EST LOW 20 MIN: CPT | Mod: S$PBB,,,

## 2024-10-22 RX ORDER — HYDROXYZINE PAMOATE 25 MG/1
25 CAPSULE ORAL EVERY 8 HOURS PRN
Qty: 20 CAPSULE | Refills: 0 | Status: SHIPPED | OUTPATIENT
Start: 2024-10-22

## 2024-10-22 RX ORDER — TRIAMCINOLONE ACETONIDE 1 MG/G
OINTMENT TOPICAL 2 TIMES DAILY
Qty: 80 G | Refills: 0 | Status: SHIPPED | OUTPATIENT
Start: 2024-10-22 | End: 2024-10-29

## 2024-10-22 NOTE — PROGRESS NOTES
"Subjective:       Patient ID: Efren Ren is a 64 y.o. male.    Vitals:  height is 5' 10" (1.778 m) and weight is 102.2 kg (225 lb 3.2 oz). His oral temperature is 97.6 °F (36.4 °C). His blood pressure is 133/69 and his pulse is 60. His respiration is 18 and oxygen saturation is 99%.     Chief Complaint: Rash (Patient states itching all over body x 4 days. States has not used any OTC meds for symptoms. Denies new soaps.)    64-year-old  male presents to the clinic with cousin, he reports pruritic rash generalized less than 1 week, has not tried any over-the-counter treatment.  He currently follows [facility] clinic for hepatitis-C treatment.    Rash  Pertinent negatives include no fever or shortness of breath.       Constitution: Negative for chills and fever.   HENT:  Negative for trouble swallowing.    Respiratory:  Negative for shortness of breath.    Genitourinary:  Negative for genital sore.   Skin:  Positive for rash.   Allergic/Immunologic: Positive for itching.       Objective:      Physical Exam   Constitutional: He appears well-developed. He is cooperative. He is easily aroused.  Non-toxic appearance. He does not appear ill. well-groomedawake  HENT:   Head: Normocephalic and atraumatic.   Mouth/Throat: Mucous membranes are moist. Oropharynx is clear.   Abdominal: Normal appearance.   Musculoskeletal: Normal range of motion.         General: Normal range of motion.   Neurological: He is alert and easily aroused.   Skin: Skin is warm, dry, not diaphoretic and rash. Capillary refill takes less than 2 seconds.         Comments: Diffused pinpoint erythematous papular rash noted, areas of  excoriation present, no vesicular formation.   Psychiatric: His speech is normal and behavior is normal. Mood and affect normal.   Nursing note and vitals reviewed.        Assessment:       1. Pruritic rash          Plan:     Encouraged patient to use sensitive skin soap and detergent, avoid exposure to excessive " temperatures, follow up with PCP if symptoms does not improve with prescriptive treatment.  Differentials discussed.      Pruritic rash  -     hydrOXYzine pamoate (VISTARIL) 25 MG Cap; Take 1 capsule (25 mg total) by mouth every 8 (eight) hours as needed (itching).  Dispense: 20 capsule; Refill: 0  -     triamcinolone acetonide 0.1% (KENALOG) 0.1 % ointment; Apply topically 2 (two) times daily. for 7 days  Dispense: 80 g; Refill: 0                Medical Decision Making:   Differential Diagnosis:   Allergic Dermatitis, Contact Dermatis, Eczema, Syphilis Rash , Among others.

## 2024-10-30 ENCOUNTER — TELEPHONE (OUTPATIENT)
Dept: INFECTIOUS DISEASES | Facility: CLINIC | Age: 64
End: 2024-10-30
Payer: MEDICAID

## 2024-11-25 ENCOUNTER — TELEPHONE (OUTPATIENT)
Dept: INFECTIOUS DISEASES | Facility: CLINIC | Age: 64
End: 2024-11-25
Payer: MEDICAID

## 2024-11-25 NOTE — LETTER
November 25, 2024    Efren Ren  [address] [address]             [facility]  [address] [address]  Phone: [phone] Dear Mr. Schwartz,        Our efforts to reach you by phone have been unsuccessful regarding your Mavyret.    Please contact our clinic by dialing  at your soonest opportunity.    Your [facility] team looks forward to hearing from you soon.      Respectfully,      [facility]

## 2024-11-25 NOTE — TELEPHONE ENCOUNTER
Received notice from [employer] that patient received his Thvtqou30/29/24 and his second refill on 11/22/24.  When calls are placed to patient receive recording that call cannot be completed at this time.    Letter mailed.

## 2025-01-14 ENCOUNTER — TELEPHONE (OUTPATIENT)
Dept: INFECTIOUS DISEASES | Facility: CLINIC | Age: 65
End: 2025-01-14
Payer: MEDICAID

## 2025-01-14 DIAGNOSIS — B18.2 CHRONIC HEPATITIS C WITHOUT HEPATIC COMA: Primary | ICD-10-CM

## 2025-01-14 NOTE — TELEPHONE ENCOUNTER
----- Message from Nurse Larson sent at 1/14/2025  1:22 PM CST -----    ----- Message -----  From: [place] Ortiz  Sent: 1/14/2025   1:06 PM CST  To: #    Patient of Debra Bautista called requesting refill for patient.  Medication glecaprevir-pibrentasvir (MAVYRET) 100-40 mg Tab.  Pharmacy [facility].     [phone]  1:047  Thanks

## 2025-01-14 NOTE — TELEPHONE ENCOUNTER
Spoke with Lily, explained we have reached out to patient with no response, patient should have completed his Mavyret at the end of December.  She was in agreement with scheduling an appointment for him to come in and see Debra, this was scheduled for Feb 4 ar 9:50 am with labs prior.

## 2025-02-12 ENCOUNTER — LAB VISIT (OUTPATIENT)
Dept: LAB | Facility: HOSPITAL | Age: 65
End: 2025-02-12
Attending: NURSE PRACTITIONER
Payer: MEDICAID

## 2025-02-12 ENCOUNTER — TELEPHONE (OUTPATIENT)
Dept: INFECTIOUS DISEASES | Facility: CLINIC | Age: 65
End: 2025-02-12
Payer: MEDICAID

## 2025-02-12 DIAGNOSIS — B18.2 CHRONIC HEPATITIS C WITHOUT HEPATIC COMA: ICD-10-CM

## 2025-02-12 LAB
ALBUMIN SERPL-MCNC: 3.9 G/DL (ref 3.4–4.8)
ALBUMIN/GLOB SERPL: 0.8 RATIO (ref 1.1–2)
ALP SERPL-CCNC: 127 UNIT/L (ref 40–150)
ALT SERPL-CCNC: 25 UNIT/L (ref 0–55)
ANION GAP SERPL CALC-SCNC: 9 MEQ/L
AST SERPL-CCNC: 22 UNIT/L (ref 5–34)
BASOPHILS # BLD AUTO: 0.09 X10(3)/MCL
BASOPHILS NFR BLD AUTO: 1.2 %
BILIRUB SERPL-MCNC: 0.3 MG/DL
BUN SERPL-MCNC: 11.6 MG/DL (ref 8.4–25.7)
CALCIUM SERPL-MCNC: 10.3 MG/DL (ref 8.8–10)
CHLORIDE SERPL-SCNC: 111 MMOL/L (ref 98–107)
CO2 SERPL-SCNC: 23 MMOL/L (ref 23–31)
CREAT SERPL-MCNC: 0.81 MG/DL (ref 0.72–1.25)
CREAT/UREA NIT SERPL: 14
EOSINOPHIL # BLD AUTO: 0.36 X10(3)/MCL (ref 0–0.9)
EOSINOPHIL NFR BLD AUTO: 4.8 %
ERYTHROCYTE [DISTWIDTH] IN BLOOD BY AUTOMATED COUNT: 14.6 % (ref 11.5–17)
GFR SERPLBLD CREATININE-BSD FMLA CKD-EPI: >60 ML/MIN/1.73/M2
GLOBULIN SER-MCNC: 4.7 GM/DL (ref 2.4–3.5)
GLUCOSE SERPL-MCNC: 103 MG/DL (ref 82–115)
HCT VFR BLD AUTO: 45 % (ref 42–52)
HCV RNA SERPL NAA+PROBE-LOG IU: NOT DETECTED {LOG_IU}/ML
HGB BLD-MCNC: 14.7 G/DL (ref 14–18)
IMM GRANULOCYTES # BLD AUTO: 0.02 X10(3)/MCL (ref 0–0.04)
IMM GRANULOCYTES NFR BLD AUTO: 0.3 %
INR PPP: 0.9
LYMPHOCYTES # BLD AUTO: 3.41 X10(3)/MCL (ref 0.6–4.6)
LYMPHOCYTES NFR BLD AUTO: 45.5 %
MCH RBC QN AUTO: 28.4 PG (ref 27–31)
MCHC RBC AUTO-ENTMCNC: 32.7 G/DL (ref 33–36)
MCV RBC AUTO: 86.9 FL (ref 80–94)
MONOCYTES # BLD AUTO: 0.78 X10(3)/MCL (ref 0.1–1.3)
MONOCYTES NFR BLD AUTO: 10.4 %
NEUTROPHILS # BLD AUTO: 2.83 X10(3)/MCL (ref 2.1–9.2)
NEUTROPHILS NFR BLD AUTO: 37.8 %
NRBC BLD AUTO-RTO: 0 %
PLATELET # BLD AUTO: 293 X10(3)/MCL (ref 130–400)
PMV BLD AUTO: 10.3 FL (ref 7.4–10.4)
POTASSIUM SERPL-SCNC: 4.6 MMOL/L (ref 3.5–5.1)
PROT SERPL-MCNC: 8.6 GM/DL (ref 5.8–7.6)
PROTHROMBIN TIME: 12.6 SECONDS (ref 11.4–14)
RBC # BLD AUTO: 5.18 X10(6)/MCL (ref 4.7–6.1)
SODIUM SERPL-SCNC: 143 MMOL/L (ref 136–145)
WBC # BLD AUTO: 7.49 X10(3)/MCL (ref 4.5–11.5)

## 2025-02-12 PROCEDURE — 85025 COMPLETE CBC W/AUTO DIFF WBC: CPT

## 2025-02-12 PROCEDURE — 80053 COMPREHEN METABOLIC PANEL: CPT

## 2025-02-12 PROCEDURE — 85610 PROTHROMBIN TIME: CPT

## 2025-02-12 PROCEDURE — 87522 HEPATITIS C REVRS TRNSCRPJ: CPT

## 2025-02-12 PROCEDURE — 36415 COLL VENOUS BLD VENIPUNCTURE: CPT

## 2025-02-12 NOTE — TELEPHONE ENCOUNTER
Phoned patient regarding followup lab work prior to 02/18/2025 appt. Mrs. Carrillo answered the phone, she is his care taker at present. Questioned lab orders completed. States out running errands today with Mr. Schwartz and can bring him to [facility] lab today for upcoming appt. Appreciative of call.

## 2025-02-18 ENCOUNTER — OFFICE VISIT (OUTPATIENT)
Dept: INFECTIOUS DISEASES | Facility: CLINIC | Age: 65
End: 2025-02-18
Payer: MEDICAID

## 2025-02-18 VITALS
HEART RATE: 85 BPM | TEMPERATURE: 98 F | WEIGHT: 240.75 LBS | SYSTOLIC BLOOD PRESSURE: 165 MMHG | DIASTOLIC BLOOD PRESSURE: 75 MMHG | HEIGHT: 70 IN | BODY MASS INDEX: 34.47 KG/M2 | RESPIRATION RATE: 16 BRPM

## 2025-02-18 DIAGNOSIS — B18.2 CHRONIC HEPATITIS C WITHOUT HEPATIC COMA: Primary | ICD-10-CM

## 2025-02-18 PROCEDURE — 99214 OFFICE O/P EST MOD 30 MIN: CPT | Mod: PBBFAC | Performed by: NURSE PRACTITIONER

## 2025-02-18 PROCEDURE — 99213 OFFICE O/P EST LOW 20 MIN: CPT | Mod: S$PBB,,, | Performed by: NURSE PRACTITIONER

## 2025-02-18 RX ORDER — OMEPRAZOLE 20 MG/1
20 CAPSULE, DELAYED RELEASE ORAL EVERY MORNING
COMMUNITY
Start: 2025-01-25

## 2025-02-18 NOTE — PROGRESS NOTES
Patient ID: Efren Ren 64 y.o.     Chief Complaint:   Chief Complaint   Patient presents with    Hepatitis C        HPI:    2/18/2  Mr. Schwartz is a 63 yo AAM presenting today for HCV f/u visit, accompanied by Lorena Solano. He completed Mavyret 3 tabs daily for 8 week treatment course during the 1st week of January 2025.  He tolerated it well & did not have any noted side effects. Labs 2/12/25 HCV RNA not detected. Will repeat in about 2 months for SVR 12 assessment. Has not been scheduled for repeat RUQ abd u/s to reassess for cirrhotic liver disease. Will plan for u/s & labs same day, orders placed. He is feeling well and denies any risk factors for HCV re-exposure.  He did have 3 wine coolers after completing treatment, advised complete abstinence.  He has also gained 15# since last visit, admits to overeating highly processed sweet foods & beverages. Strongly advised dietary revisions to primarily clean eating. Voiced intent to try. All questions answered & concerns addressed.    10/17/24  Mr. Schwartz is a 63 yo AAM here today for HCV f/u visit, accompanied by Lorena and Russ today.  Lab results reviewed. 10/3/24 HCV RNA 2.19 mil copies, GT 1a, A1-2 F3 disease.  Syphilis ab +, RPR NR.  He tells me that he does have a history of syphilis, treated at the local health unit when he was 15-16 years old. HIV NR.  Hep A immune. Hep B core ab +, Hep B s ag negative. He remains clean & sober. He takes pantoprazole 40 mg daily for severe gastritis.  Tried omeprazole in the past, not effective. He is not a candidate to hold, reduce, or replace PPI treatment. Will proceed with prescription for Mavyret 3 tabs daily for 8 weeks.  He is ready for treatment and will be assisted by Lorena to ensure adherence. He has no questions or concerns today.      10/3/24  Mr. Schwartz is a 63 yo AAM referred to [facility] by PCP FELICAI Mooney NP for newly diagnosed HCV infection (5/24).  He is accompanied by nijuan f Carrillo today. He is treatment naive  and has a history significant for one jailhouse tattoo.  Denies any blood transfusion, IVDU, or sexual contact with HCV + partner. He was a plasma donor in the 80s and has 2 ear piercings. He has a long standing history of heavy alcohol use, quit drinking 3 months ago. He is feeling well today. He denies jaundice, icterus, nausea, vomiting, dark urine, or lima colored stools. No abdominal distension. Labs 5/28/24 confirm infection with HCV RNA 2.96 mil copies. He did have an u/s completed with [facility] that demonstrated nodular surface of liver on 6/13/24. However, FibroScan  completed 9/24/24 & resulted S2-3, F0-1. Will get additional labs today & plan for repeat u/s in about 3 months or so. Voiced understanding & appreciation. He is eager to move forward with treatment plan & agrees to adhere to recommendations. All questions answered & concerns addressed.           Past Medical History:   Diagnosis Date    GERD (gastroesophageal reflux disease)     Hepatitis C     Hypertension     Unspecified cirrhosis of liver         Past Surgical History:   Procedure Laterality Date    HIP SURGERY Left     LEG SURGERY Left         Social History     Socioeconomic History    Marital status: Single   Tobacco Use    Smoking status: Some Days     Current packs/day: 0.25     Average packs/day: 0.3 packs/day for 50.1 years (12.5 ttl pk-yrs)     Types: Cigarettes     Start date: 1975    Smokeless tobacco: Never    Tobacco comments:     States smokes 1-2 cigarettes per day   Substance and Sexual Activity    Alcohol use: Not Currently     Comment: Stopped drinking    Drug use: Yes     Types: Marijuana     Comment: occassionaly    Sexual activity: Yes     Partners: Female     Birth control/protection: Condom     Social Drivers of Health     Financial Resource Strain: Low Risk  (10/17/2023)    Overall Financial Resource Strain (CARDIA)     Difficulty of Paying Living Expenses: Not very hard   Food Insecurity: Food Insecurity  Present (10/17/2023)    Hunger Vital Sign     Worried About Running Out of Food in the Last Year: Sometimes true     Ran Out of Food in the Last Year: Sometimes true   Transportation Needs: Unmet Transportation Needs (10/17/2023)    PRAPARE - Transportation     Lack of Transportation (Medical): Yes     Lack of Transportation (Non-Medical): Yes   Physical Activity: Inactive (10/17/2023)    Exercise Vital Sign     Days of Exercise per Week: 0 days     Minutes of Exercise per Session: 0 min   Stress: Stress Concern Present (10/17/2023)    [employer] - Occupational Stress Questionnaire     Feeling of Stress : Very much   Housing Stability: High Risk (10/17/2023)    Housing Stability Vital Sign     Unable to Pay for Housing in the Last Year: Yes     Number of Places Lived in the Last Year: 2     Unstable Housing in the Last Year: No        Family History   Problem Relation Name Age of Onset    Hyperlipidemia Mother      Hypertension Mother      Lung cancer Father      Diabetes Sister Chanel Ren     Diabetes Brother Nick     Bipolar disorder Daughter      Schizophrenia Daughter      No Known Problems Maternal Grandmother      No Known Problems Maternal Grandfather      No Known Problems Paternal Grandmother      No Known Problems Paternal Grandfather          Review of patient's allergies indicates:  No Known Allergies     Immunization History   Administered Date(s) Administered    COVID-19, MRNA, LN-S, PF (Pfizer) (Purple Cap) 11/19/2021        Review of Systems   Constitutional: Negative.    HENT: Negative.     Eyes: Negative.    Respiratory: Negative.     Cardiovascular: Negative.    Gastrointestinal: Negative.    Genitourinary: Negative.    Musculoskeletal: Negative.    Skin: Negative.    Neurological: Negative.    Endo/Heme/Allergies: Negative.    Psychiatric/Behavioral: Negative.     All other systems reviewed and are negative.         Objective:      BP (!) 165/75   Pulse  "85   Temp 98.1 °F (36.7 °C) (Oral)   Resp 16   Ht 5' 10" (1.778 m)   Wt 109.2 kg (240 lb 11.9 oz)   BMI 34.54 kg/m²      Physical Exam  Vitals reviewed.   Constitutional:       General: He is not in acute distress.     Appearance: Normal appearance. He is not toxic-appearing.   Eyes:      General: No scleral icterus.  Cardiovascular:      Rate and Rhythm: Normal rate and regular rhythm.      Heart sounds: Normal heart sounds.   Pulmonary:      Effort: Pulmonary effort is normal. No respiratory distress.      Breath sounds: Normal breath sounds.   Abdominal:      General: Bowel sounds are normal. There is no distension.      Palpations: Abdomen is soft. There is no mass.      Tenderness: There is no abdominal tenderness.   Musculoskeletal:         General: Normal range of motion.   Skin:     General: Skin is warm and dry.   Neurological:      Mental Status: He is alert and oriented to person, place, and time.          Labs:   Lab Results   Component Value Date    WBC 7.49 02/12/2025    HGB 14.7 02/12/2025    HCT 45.0 02/12/2025    MCV 86.9 02/12/2025     02/12/2025       CMP  Sodium   Date Value Ref Range Status   02/12/2025 143 136 - 145 mmol/L Final     Potassium   Date Value Ref Range Status   02/12/2025 4.6 3.5 - 5.1 mmol/L Final     Chloride   Date Value Ref Range Status   02/12/2025 111 (H) 98 - 107 mmol/L Final     CO2   Date Value Ref Range Status   02/12/2025 23 23 - 31 mmol/L Final     Blood Urea Nitrogen   Date Value Ref Range Status   02/12/2025 11.6 8.4 - 25.7 mg/dL Final     Creatinine   Date Value Ref Range Status   02/12/2025 0.81 0.72 - 1.25 mg/dL Final     Calcium   Date Value Ref Range Status   02/12/2025 10.3 (H) 8.8 - 10.0 mg/dL Final     Albumin   Date Value Ref Range Status   02/12/2025 3.9 3.4 - 4.8 g/dL Final     Bilirubin Total   Date Value Ref Range Status   02/12/2025 0.3 <=1.5 mg/dL Final     ALP   Date Value Ref Range Status   02/12/2025 127 40 - 150 unit/L Final     AST "   Date Value Ref Range Status   02/12/2025 22 5 - 34 unit/L Final     ALT   Date Value Ref Range Status   02/12/2025 25 0 - 55 unit/L Final     eGFR   Date Value Ref Range Status   02/12/2025 >60 mL/min/1.73/m2 Final     Comment:     Estimated GFR calculated using the CKD-EPI creatinine (2021) equation.     Lab Results   Component Value Date    TSH 0.870 10/17/2023     HCV Genotype   Date Value Ref Range Status   10/03/2024 1a (A) Undetected Final     Comment:        -------------------ADDITIONAL INFORMATION-------------------  This test was performed using the Abbott RealTime HCV   Genotype II assay ([employer] [place], [place]).     Test Performed by:  [place] [address]  : Roger Rudd Ph.D.; CLIA# [id number]     HCV RNA Detect/Quant   Date Value Ref Range Status   10/03/2024 8998242 (A) Undetected IU/mL Final     Comment:     Result in log IU/mL is 6.34.     -------------------ADDITIONAL INFORMATION-------------------  The quantification range of this assay is 15 to 100,000,000   IU/mL (1.18 log to 8.00 log IU/mL). Testing was performed   using the steven HCV test ([employer]     Test Performed by:  [place] [address]  : Roger Rudd Ph.D.; CLIA# [id number]     HIV   Date Value Ref Range Status   10/03/2024 Nonreactive Nonreactive Final     INR   Date Value Ref Range Status   02/12/2025 0.9 <=1.3 Final     Syphilis Antibody   Date Value Ref Range Status   10/03/2024 Reactive (A) Nonreactive, Equivocal Final     Hep BsAb Interp   Date Value Ref Range Status   10/03/2024 Nonreactive Nonreactive Final     Hep BsAb   Date Value Ref Range Status   10/03/2024 2.65 mIU/mL Final     Comment:     Interpretive Data Hep Bs Ab#  Result (mIU/mL)Interpretation - Hep B Surface Antibody  <8.00Nonreactive:  Patient considered not immune to HBV infection  >=8.00 to <12.00Grayzone: Unable to determine immune status. Follow-up testing should be performed  >=12.00Reactive: Patient considered immune to HBV infection       Hep BcAb Interp   Date Value Ref Range Status   10/03/2024 Reactive (A) Nonreactive Final     Hep A IgG Interp   Date Value Ref Range Status   10/03/2024 Reactive (A) Nonreactive Final     FibroTest Stage   Date Value Ref Range Status   10/03/2024 F3  Final     ActiTest Grade   Date Value Ref Range Status   10/03/2024 A1-A2  Final     Alpha-2-Macroglobulin   Date Value Ref Range Status   10/03/2024 360 (H) 100 - 280 mg/dL Final     Haptoglobin   Date Value Ref Range Status   10/03/2024 86 30 - 200 mg/dL Final     Alanine Aminotransferase (ALT)   Date Value Ref Range Status   10/03/2024 53 7 - 55 U/L Final     Gamma Glutamyltransferase (GGT)   Date Value Ref Range Status   10/03/2024 31 8 - 61 U/L Final     Bilirubin, Total   Date Value Ref Range Status   10/03/2024 0.4 0.0 - 1.2 mg/dL Final     Comment:        Test Performed by:  [place] [address]  : Roger Rudd Ph.D.; CLIA# [id number]     Test Performed by:  [place] [address]  : Roger Rudd Ph.D.; CLIA# [id number]     Ferritin Level   Date Value Ref Range Status   10/03/2024 354.40 (H) 21.81 - 274.66 ng/mL Final     Antinuclear Ab, HEp-2 Substrate   Date Value Ref Range Status   10/03/2024 <1:80 (Negative) <1:80 (Negative) Final     Comment:        -------------------ADDITIONAL INFORMATION-------------------  Method: Immunofluorescence using HEp-2 cellular substrate.     Test Performed by:  [place] [address]  : Roger Rudd Ph.D.; CLIA# [id number]       Imaging: Reviewed most  recent relevant imaging studies available, notable results highlighted in this note      Medications:     Current Outpatient Medications   Medication Instructions    amLODIPine (NORVASC) 10 mg    cyclobenzaprine (FLEXERIL) 10 mg, 2 times daily PRN    ergocalciferol (ERGOCALCIFEROL) 50,000 Units, Every 7 days    gabapentin (NEURONTIN) 300 mg, Nightly    hydrOXYzine pamoate (VISTARIL) 25 mg, Oral, Every 8 hours PRN    indomethacin (INDOCIN) 25 mg, Oral, 2 times daily PRN    losartan (COZAAR) 50 mg    omeprazole (PRILOSEC) 20 mg, Every morning    sertraline (ZOLOFT) 100 mg, Oral, Nightly       Assessment:       Problem List Items Addressed This Visit    None  Visit Diagnoses         Chronic hepatitis C without hepatic coma    -  Primary    Relevant Orders    US Abdomen Limited    Hepatitis C RNA, Quantitative, PCR               Plan:      Chronic hepatitis C without hepatic coma  -     US Abdomen Limited; Future; Expected date: 04/15/2025  -     Hepatitis C RNA, Quantitative, PCR; Future; Expected date: 04/15/2025  Diagnosed 5/2024.  Treatment naive.  GT 1a, baseline VL 2.96 mil (5/24), 2.19 mil (10/24)  Fibrosure: A1-2, F3.   FibroScan 9/24/24 S2-3, F0-1.  RUQ abdominal u/s: 6/24 nodular surface of liver. Repeat prior to next visit.   Blood precautions: do not share a razor, needle, toothbrush, clippers with anyone.  Completed Mavyret 3 tabs daily x 8 weeks, first week of January 2025.  HCV RNA not detected 2/12/25.  Repeat HCV RNA approximately 4/15/25 & RUQ abd u/s same day.  RTC 2-3 months with Debra.

## 2025-03-11 ENCOUNTER — HOSPITAL ENCOUNTER (OUTPATIENT)
Dept: RADIOLOGY | Facility: HOSPITAL | Age: 65
Discharge: HOME | End: 2025-03-11
Attending: NURSE PRACTITIONER
Payer: MEDICAID

## 2025-03-11 DIAGNOSIS — B18.2 CHRONIC HEPATITIS C WITHOUT HEPATIC COMA: ICD-10-CM

## 2025-03-11 PROCEDURE — 76705 ECHO EXAM OF ABDOMEN: CPT | Mod: TC

## 2025-03-12 ENCOUNTER — RESULTS FOLLOW-UP (OUTPATIENT)
Dept: INFECTIOUS DISEASES | Facility: CLINIC | Age: 65
End: 2025-03-12

## 2025-03-12 ENCOUNTER — TELEPHONE (OUTPATIENT)
Dept: INFECTIOUS DISEASES | Facility: CLINIC | Age: 65
End: 2025-03-12
Payer: MEDICAID

## 2025-03-12 NOTE — TELEPHONE ENCOUNTER
----- Message from MARIANNA Maria sent at 3/12/2025  1:55 PM CDT -----  US shows some mild steatosis. I recommend the following:  Goal BMI < 25.   Limit consumption of high fat foods, high sugar foods, and salt.   Avoid alcohol and illicit drug use.  Increase exercise activity; 30 minutes of strenuous activity 3-5 x week.   ----- Message -----  From: Interface, Rad Results In  Sent: 3/11/2025   6:53 PM CDT  To: SARITHA Arellano

## 2025-03-12 NOTE — PROGRESS NOTES
US shows some mild steatosis. I recommend the following:  Goal BMI < 25.   Limit consumption of high fat foods, high sugar foods, and salt.   Avoid alcohol and illicit drug use.  Increase exercise activity; 30 minutes of strenuous activity 3-5 x week.

## 2025-03-12 NOTE — PROGRESS NOTES
Phoned patient's caretaker, Lorena. Discussed Abdominal U/S results. Per provider, U/S shows some mild steatosis (mild fatty liver). Recommended the following:   Goal BMI < 25.   Limit consumption of high fat foods, high sugar foods, and salt.   Avoid alcohol and illicit drug use.  Increase exercise activity; 30 minutes of strenuous activity 3-5 x week.    Voiced understanding and appreciates call.

## 2025-05-19 ENCOUNTER — LAB VISIT (OUTPATIENT)
Dept: LAB | Facility: HOSPITAL | Age: 65
End: 2025-05-19
Attending: NURSE PRACTITIONER
Payer: MEDICAID

## 2025-05-19 DIAGNOSIS — B18.2 CHRONIC HEPATITIS C WITHOUT HEPATIC COMA: ICD-10-CM

## 2025-05-19 PROCEDURE — 87522 HEPATITIS C REVRS TRNSCRPJ: CPT

## 2025-05-19 PROCEDURE — 36415 COLL VENOUS BLD VENIPUNCTURE: CPT

## 2025-05-20 ENCOUNTER — TELEPHONE (OUTPATIENT)
Dept: INFECTIOUS DISEASES | Facility: CLINIC | Age: 65
End: 2025-05-20
Payer: MEDICAID

## 2025-05-20 ENCOUNTER — OFFICE VISIT (OUTPATIENT)
Dept: INFECTIOUS DISEASES | Facility: CLINIC | Age: 65
End: 2025-05-20
Payer: MEDICAID

## 2025-05-20 VITALS
RESPIRATION RATE: 10 BRPM | HEART RATE: 70 BPM | DIASTOLIC BLOOD PRESSURE: 71 MMHG | TEMPERATURE: 98 F | WEIGHT: 227.63 LBS | SYSTOLIC BLOOD PRESSURE: 136 MMHG | HEIGHT: 70 IN | BODY MASS INDEX: 32.59 KG/M2

## 2025-05-20 DIAGNOSIS — Z86.19 HISTORY OF HEPATITIS C: Primary | ICD-10-CM

## 2025-05-20 LAB
HCV RNA SERPL NAA+PROBE-ACNC: NOT DETECTED K[IU]/ML
HCV RNA SERPL NAA+PROBE-LOG IU: NOT DETECTED {LOG_IU}/ML

## 2025-05-20 PROCEDURE — 3078F DIAST BP <80 MM HG: CPT | Mod: CPTII,,, | Performed by: NURSE PRACTITIONER

## 2025-05-20 PROCEDURE — 3075F SYST BP GE 130 - 139MM HG: CPT | Mod: CPTII,,, | Performed by: NURSE PRACTITIONER

## 2025-05-20 PROCEDURE — 1160F RVW MEDS BY RX/DR IN RCRD: CPT | Mod: CPTII,,, | Performed by: NURSE PRACTITIONER

## 2025-05-20 PROCEDURE — 99214 OFFICE O/P EST MOD 30 MIN: CPT | Mod: PBBFAC | Performed by: NURSE PRACTITIONER

## 2025-05-20 PROCEDURE — 3008F BODY MASS INDEX DOCD: CPT | Mod: CPTII,,, | Performed by: NURSE PRACTITIONER

## 2025-05-20 PROCEDURE — 1159F MED LIST DOCD IN RCRD: CPT | Mod: CPTII,,, | Performed by: NURSE PRACTITIONER

## 2025-05-20 PROCEDURE — 99213 OFFICE O/P EST LOW 20 MIN: CPT | Mod: S$PBB,,, | Performed by: NURSE PRACTITIONER

## 2025-05-20 PROCEDURE — 4010F ACE/ARB THERAPY RXD/TAKEN: CPT | Mod: CPTII,,, | Performed by: NURSE PRACTITIONER

## 2025-05-20 RX ORDER — OXYBUTYNIN CHLORIDE 10 MG/1
TABLET, EXTENDED RELEASE ORAL
COMMUNITY
Start: 2025-05-15 | End: 2025-11-10

## 2025-05-20 RX ORDER — NALOXONE HYDROCHLORIDE 4 MG/.1ML
SPRAY NASAL
COMMUNITY

## 2025-05-20 RX ORDER — DULOXETIN HYDROCHLORIDE 20 MG/1
1 CAPSULE, DELAYED RELEASE ORAL
COMMUNITY
Start: 2025-05-15

## 2025-05-20 RX ORDER — ARIPIPRAZOLE 5 MG/1
TABLET ORAL
COMMUNITY

## 2025-05-20 RX ORDER — IBUPROFEN 600 MG/1
TABLET, FILM COATED ORAL
COMMUNITY
Start: 2025-05-15 | End: 2026-05-10

## 2025-05-20 RX ORDER — ESCITALOPRAM OXALATE 10 MG/1
TABLET ORAL
COMMUNITY

## 2025-05-20 RX ORDER — BUSPIRONE HYDROCHLORIDE 10 MG/1
TABLET ORAL
COMMUNITY
Start: 2025-05-15 | End: 2026-05-10

## 2025-05-20 NOTE — TELEPHONE ENCOUNTER
Phoned patient's daughter, Lorena. Informed that lab work is completed and resulted. [facility] will bring patient to appt this afternoon.

## 2025-05-20 NOTE — PROGRESS NOTES
Patient ID: Efren Ren 65 y.o.     Chief Complaint:   Chief Complaint   Patient presents with    Followup Hep C     Did blood work yesterday        HPI:    5/20/25  Mr. Schwartz is a 64 yo AAM here today for HCV f/u visit, accompanied by Lorena. Repeat HCV RNA collected 5/19/25, not detected.  Repeat RUQ abd u/s completed 3/11/25, mild steatosis noted. No indication of cirrhosis of the liver. Reports slight occasional alcohol use, complete cessation still encouraged. He denies jaundice, icterus, nausea, vomiting, dark urine, or lima colored stools.    Post HCV counseling provided, voiced understanding and appreciation.     2/18/2  Mr. Schwartz is a 65 yo AAM presenting today for HCV f/u visit, accompanied by Lorena Solano. He completed Mavyret 3 tabs daily for 8 week treatment course during the 1st week of January 2025.  He tolerated it well & did not have any noted side effects. Labs 2/12/25 HCV RNA not detected. Will repeat in about 2 months for SVR 12 assessment. Has not been scheduled for repeat RUQ abd u/s to reassess for cirrhotic liver disease. Will plan for u/s & labs same day, orders placed. He is feeling well and denies any risk factors for HCV re-exposure.  He did have 3 wine coolers after completing treatment, advised complete abstinence.  He has also gained 15# since last visit, admits to overeating highly processed sweet foods & beverages. Strongly advised dietary revisions to primarily clean eating. Voiced intent to try. All questions answered & concerns addressed.     10/17/24  Mr. Schwartz is a 65 yo AAM here today for HCV f/u visit, accompanied by Patricia today.  Lab results reviewed. 10/3/24 HCV RNA 2.19 mil copies, GT 1a, A1-2 F3 disease.  Syphilis ab +, RPR NR.  He tells me that he does have a history of syphilis, treated at the local health unit when he was 15-16 years old. HIV NR.  Hep A immune. Hep B core ab +, Hep B s ag negative. He remains clean & sober. He takes pantoprazole 40 mg  daily for severe gastritis.  Tried omeprazole in the past, not effective. He is not a candidate to hold, reduce, or replace PPI treatment. Will proceed with prescription for Mavyret 3 tabs daily for 8 weeks.  He is ready for treatment and will be assisted by Lorena to ensure adherence. He has no questions or concerns today.      10/3/24  Mr. Schwartz is a 63 yo AAM referred to [facility] by PCP FELICIA Mooney NP for newly diagnosed HCV infection (5/24).  He is accompanied by melinda Carrillo today. He is treatment naive and has a history significant for one jailhouse tattoo.  Denies any blood transfusion, IVDU, or sexual contact with HCV + partner. He was a plasma donor in the 80s and has 2 ear piercings. He has a long standing history of heavy alcohol use, quit drinking 3 months ago. He is feeling well today. He denies jaundice, icterus, nausea, vomiting, dark urine, or lima colored stools. No abdominal distension. Labs 5/28/24 confirm infection with HCV RNA 2.96 mil copies. He did have an u/s completed with [facility] that demonstrated nodular surface of liver on 6/13/24. However, FibroScan  completed 9/24/24 & resulted S2-3, F0-1. Will get additional labs today & plan for repeat u/s in about 3 months or so. Voiced understanding & appreciation. He is eager to move forward with treatment plan & agrees to adhere to recommendations. All questions answered & concerns addressed.           Past Medical History:   Diagnosis Date    GERD (gastroesophageal reflux disease)     Hepatitis C     Hypertension     Unspecified cirrhosis of liver         Past Surgical History:   Procedure Laterality Date    HIP SURGERY Left     LEG SURGERY Left         Social History     Socioeconomic History    Marital status: Single   Tobacco Use    Smoking status: Some Days     Current packs/day: 0.25     Average packs/day: 0.3 packs/day for 50.4 years (12.6 ttl pk-yrs)     Types: Cigarettes     Start date: 1975    Smokeless tobacco: Never    Tobacco  comments:     States smokes 1-2 cigarettes per day   Substance and Sexual Activity    Alcohol use: Not Currently     Comment: Stopped drinking    Drug use: Yes     Types: Marijuana     Comment: occassionaly    Sexual activity: Yes     Partners: Female     Birth control/protection: Condom     Social Drivers of Health     Financial Resource Strain: Low Risk  (10/17/2023)    Overall Financial Resource Strain (CARDIA)     Difficulty of Paying Living Expenses: Not very hard   Food Insecurity: Food Insecurity Present (10/17/2023)    Hunger Vital Sign     Worried About Running Out of Food in the Last Year: Sometimes true     Ran Out of Food in the Last Year: Sometimes true   Transportation Needs: Unmet Transportation Needs (10/17/2023)    PRAPARE - Transportation     Lack of Transportation (Medical): Yes     Lack of Transportation (Non-Medical): Yes   Physical Activity: Inactive (10/17/2023)    Exercise Vital Sign     Days of Exercise per Week: 0 days     Minutes of Exercise per Session: 0 min   Stress: Stress Concern Present (10/17/2023)    [employer] - Occupational Stress Questionnaire     Feeling of Stress : Very much   Housing Stability: High Risk (10/17/2023)    Housing Stability Vital Sign     Unable to Pay for Housing in the Last Year: Yes     Number of Places Lived in the Last Year: 2     Unstable Housing in the Last Year: No        Family History   Problem Relation Name Age of Onset    Hyperlipidemia Mother      Hypertension Mother      Lung cancer Father      Diabetes Sister Chanel Ren     Diabetes Brother Nick     Bipolar disorder Daughter      Schizophrenia Daughter      No Known Problems Maternal Grandmother      No Known Problems Maternal Grandfather      No Known Problems Paternal Grandmother      No Known Problems Paternal Grandfather          Review of patient's allergies indicates:  No Known Allergies     Immunization History   Administered Date(s) Administered     "COVID-19, MRNA, LN-S, PF (Pfizer) (Purple Cap) 11/19/2021        Review of Systems   Constitutional: Negative.    HENT: Negative.     Eyes: Negative.    Respiratory: Negative.     Cardiovascular: Negative.    Gastrointestinal: Negative.    Genitourinary: Negative.    Musculoskeletal: Negative.    Skin: Negative.    Neurological: Negative.    Endo/Heme/Allergies: Negative.    Psychiatric/Behavioral: Negative.     All other systems reviewed and are negative.         Objective:      /71   Pulse 70   Temp 97.6 °F (36.4 °C) (Oral)   Resp 10   Ht 5' 10" (1.778 m)   Wt 103.3 kg (227 lb 10 oz)   BMI 32.66 kg/m²      Physical Exam  Vitals reviewed.   Constitutional:       General: He is not in acute distress.     Appearance: Normal appearance. He is not toxic-appearing.   Eyes:      General: No scleral icterus.  Cardiovascular:      Rate and Rhythm: Normal rate and regular rhythm.      Heart sounds: Normal heart sounds.   Pulmonary:      Effort: Pulmonary effort is normal. No respiratory distress.      Breath sounds: Normal breath sounds.   Abdominal:      General: Bowel sounds are normal. There is no distension.      Palpations: Abdomen is soft. There is no mass.      Tenderness: There is no abdominal tenderness.   Musculoskeletal:         General: Normal range of motion.   Skin:     General: Skin is warm and dry.   Neurological:      Mental Status: He is alert and oriented to person, place, and time.          Labs:   Lab Results   Component Value Date    WBC 7.49 02/12/2025    HGB 14.7 02/12/2025    HCT 45.0 02/12/2025    MCV 86.9 02/12/2025     02/12/2025       CMP  Sodium   Date Value Ref Range Status   02/12/2025 143 136 - 145 mmol/L Final     Potassium   Date Value Ref Range Status   02/12/2025 4.6 3.5 - 5.1 mmol/L Final     Chloride   Date Value Ref Range Status   02/12/2025 111 (H) 98 - 107 mmol/L Final     CO2   Date Value Ref Range Status   02/12/2025 23 23 - 31 mmol/L Final     Glucose   Date " Value Ref Range Status   02/12/2025 103 82 - 115 mg/dL Final     Blood Urea Nitrogen   Date Value Ref Range Status   02/12/2025 11.6 8.4 - 25.7 mg/dL Final     Creatinine   Date Value Ref Range Status   02/12/2025 0.81 0.72 - 1.25 mg/dL Final     Calcium   Date Value Ref Range Status   02/12/2025 10.3 (H) 8.8 - 10.0 mg/dL Final     Protein Total   Date Value Ref Range Status   02/12/2025 8.6 (H) 5.8 - 7.6 gm/dL Final     Albumin   Date Value Ref Range Status   02/12/2025 3.9 3.4 - 4.8 g/dL Final     Bilirubin Total   Date Value Ref Range Status   02/12/2025 0.3 <=1.5 mg/dL Final     ALP   Date Value Ref Range Status   02/12/2025 127 40 - 150 unit/L Final     AST   Date Value Ref Range Status   02/12/2025 22 5 - 34 unit/L Final     ALT   Date Value Ref Range Status   02/12/2025 25 0 - 55 unit/L Final     eGFR   Date Value Ref Range Status   02/12/2025 >60 mL/min/1.73/m2 Final     Comment:     Estimated GFR calculated using the CKD-EPI creatinine (2021) equation.     Lab Results   Component Value Date    TSH 0.870 10/17/2023     HCV Genotype   Date Value Ref Range Status   10/03/2024 1a (A) Undetected Final     Comment:        -------------------ADDITIONAL INFORMATION-------------------  This test was performed using the [employer] RealTime HCV   Genotype II assay ([employer] [place], [place]).     Test Performed by:  [facility]  [address] [place] [address]  : Roger Rudd Ph.D.; CLIA# [id number]     HCV RNA Detect/Quant   Date Value Ref Range Status   10/03/2024 3686982 (A) Undetected IU/mL Final     Comment:     Result in log IU/mL is 6.34.     -------------------ADDITIONAL INFORMATION-------------------  The quantification range of this assay is 15 to 100,000,000   IU/mL (1.18 log to 8.00 log IU/mL). Testing was performed   using the steven HCV test ([employer]     Test Performed by:  [facility]  [address] [place] [address]  : Roger Rudd Ph.D.; CLIA# [id number]     HIV   Date Value Ref Range Status   10/03/2024 Nonreactive Nonreactive Final     PT   Date Value Ref Range Status   02/12/2025 12.6 11.4 - 14.0 seconds Final     INR   Date Value Ref Range Status   02/12/2025 0.9 <=1.3 Final     Syphilis Antibody   Date Value Ref Range Status   10/03/2024 Reactive (A) Nonreactive, Equivocal Final     Hep BsAb Interp   Date Value Ref Range Status   10/03/2024 Nonreactive Nonreactive Final     Hep BsAb   Date Value Ref Range Status   10/03/2024 2.65 mIU/mL Final     Comment:     Interpretive Data Hep Bs Ab#  Result (mIU/mL)Interpretation - Hep B Surface Antibody  <8.00Nonreactive: Patient considered not immune to HBV infection  >=8.00 to <12.00Grayzone: Unable to determine immune status. Follow-up testing should be performed  >=12.00Reactive: Patient considered immune to HBV infection       Hep BcAb Interp   Date Value Ref Range Status   10/03/2024 Reactive (A) Nonreactive Final     Hep A IgG Interp   Date Value Ref Range Status   10/03/2024 Reactive (A) Nonreactive Final     FibroTest Stage   Date Value Ref Range Status   10/03/2024 F3  Final     ActiTest Grade   Date Value Ref Range Status   10/03/2024 A1-A2  Final     Alpha-2-Macroglobulin   Date Value Ref Range Status   10/03/2024 360 (H) 100 - 280 mg/dL Final     Haptoglobin   Date Value Ref Range Status   10/03/2024 86 30 - 200 mg/dL Final     Alanine Aminotransferase (ALT)   Date Value Ref Range Status   10/03/2024 53 7 - 55 U/L Final     Gamma Glutamyltransferase (GGT)   Date Value Ref Range Status   10/03/2024 31 8 - 61 U/L Final     Bilirubin, Total   Date Value Ref Range Status   10/03/2024 0.4 0.0 - 1.2 mg/dL Final     Comment:        Test Performed by:  [facility]  [address] [place] [address]  : Roger Rudd Ph.D.; CLIA#  [id number]     Test Performed by:  [address] [address]  : Roger Rudd Ph.D.; CLIA# [id number]     Ferritin Level   Date Value Ref Range Status   10/03/2024 354.40 (H) 21.81 - 274.66 ng/mL Final     Antinuclear Ab, HEp-2 Substrate   Date Value Ref Range Status   10/03/2024 <1:80 (Negative) <1:80 (Negative) Final     Comment:        -------------------ADDITIONAL INFORMATION-------------------  Method: Immunofluorescence using HEp-2 cellular substrate.     Test Performed by:  [place] [id number]  : Roger Rudd Ph.D.; CLIA# [id number]       Imaging: Reviewed most recent relevant imaging studies available, notable results highlighted in this note      Medications:     Current Outpatient Medications   Medication Instructions    amLODIPine (NORVASC) 10 mg    ARIPiprazole (ABILIFY) 5 MG Tab 1 tablet Oral Once a day for 30 days    busPIRone (BUSPAR) 10 MG tablet 1 tablet Orally Twice a day for 30 days    cyclobenzaprine (FLEXERIL) 10 mg, 2 times daily PRN    DULoxetine (CYMBALTA) 20 MG capsule 1 capsule    ergocalciferol (ERGOCALCIFEROL) 50,000 Units, Every 7 days    EScitalopram oxalate (LEXAPRO) 10 MG tablet Oral for 30 Days    gabapentin (NEURONTIN) 300 mg, Nightly    hydrOXYzine pamoate (VISTARIL) 25 mg, Oral, Every 8 hours PRN    ibuprofen (ADVIL,MOTRIN) 600 MG tablet 1 tablet with food or milk as needed Orally every 8 hrs for 30 days  As needed    indomethacin (INDOCIN) 25 mg, Oral, 2 times daily PRN    losartan (COZAAR) 50 mg    naloxone (NARCAN) 4 mg/actuation Spry Nasal for 1 Days    omeprazole (PRILOSEC) 20 mg, Every morning    oxybutynin (DITROPAN-XL) 10 MG 24 hr tablet 1 tablet Orally Once a day for 30 days    sertraline (ZOLOFT) 100 mg, Oral, Nightly       Assessment:       Problem List Items Addressed This Visit     None  Visit Diagnoses         History of hepatitis C    -  Primary               Plan:      History of hepatitis C  HISTORY: Diagnosed 5/2024.  Treatment naive.  GT 1a, baseline VL 2.96 mil (5/24), 2.19 mil (10/24)  Fibrosure: A1-2, F3.   FibroScan 9/24/24 S2-3, F0-1.  RUQ abdominal u/s: 6/24 nodular surface of liver.3/11/25: mild steatosis.  Blood precautions: do not share a razor, needle, toothbrush, clippers with anyone.  Completed Mavyret 3 tabs daily x 8 weeks, first week of January 2025.  HCV RNA not detected 2/12/25, 5/19/25 indicating SVR.   Post HCV cure education provided as follows:    Congratulations on your Hepatitis C cure, to minimize risk of re-infection or progression of liver damage please consider the following recommendations.   Do not share a razor, toothbrush, needle, clippers with anyone.   Avoid all illicit drugs.  Minimize alcohol intake.   Hep C antibody will be positive for life, but does not provide immunity.  If you need repeat testing for Hepatitis C reinfection, will require Hepatitis C Viral Load (RNA) test.  Do not donate blood.  RTC PRN.   Follow-up with PCP for routine medical needs.

## 2025-05-20 NOTE — TELEPHONE ENCOUNTER
[place] [facility] Infectious Disease Clinical Support Staff  Caller: Unspecified (Today,  8:02 AM)  Patient of Debra Carrillo called on behalf of patient, stating patient had labs done yesterday.    Lorena wanted to reschedule due to labs just done.    Please advised if patient should cancel?    Lorena contact # [phone].    8:07  Thank